# Patient Record
Sex: MALE | Race: BLACK OR AFRICAN AMERICAN | NOT HISPANIC OR LATINO
[De-identification: names, ages, dates, MRNs, and addresses within clinical notes are randomized per-mention and may not be internally consistent; named-entity substitution may affect disease eponyms.]

---

## 2017-06-06 ENCOUNTER — LABORATORY RESULT (OUTPATIENT)
Age: 47
End: 2017-06-06

## 2017-06-06 ENCOUNTER — APPOINTMENT (OUTPATIENT)
Dept: INTERNAL MEDICINE | Facility: CLINIC | Age: 47
End: 2017-06-06

## 2017-06-06 ENCOUNTER — NON-APPOINTMENT (OUTPATIENT)
Age: 47
End: 2017-06-06

## 2017-06-06 VITALS
SYSTOLIC BLOOD PRESSURE: 137 MMHG | BODY MASS INDEX: 31.37 KG/M2 | HEART RATE: 86 BPM | WEIGHT: 207 LBS | DIASTOLIC BLOOD PRESSURE: 87 MMHG | HEIGHT: 68 IN

## 2017-06-06 DIAGNOSIS — Z87.891 PERSONAL HISTORY OF NICOTINE DEPENDENCE: ICD-10-CM

## 2017-06-06 DIAGNOSIS — Z82.49 FAMILY HISTORY OF ISCHEMIC HEART DISEASE AND OTHER DISEASES OF THE CIRCULATORY SYSTEM: ICD-10-CM

## 2017-06-06 DIAGNOSIS — Z83.3 FAMILY HISTORY OF DIABETES MELLITUS: ICD-10-CM

## 2017-06-06 DIAGNOSIS — L30.9 DERMATITIS, UNSPECIFIED: ICD-10-CM

## 2017-06-06 DIAGNOSIS — N39.0 URINARY TRACT INFECTION, SITE NOT SPECIFIED: ICD-10-CM

## 2017-06-07 ENCOUNTER — MESSAGE (OUTPATIENT)
Age: 47
End: 2017-06-07

## 2017-06-07 DIAGNOSIS — E56.9 VITAMIN DEFICIENCY, UNSPECIFIED: ICD-10-CM

## 2017-06-07 LAB
25(OH)D3 SERPL-MCNC: 8.6 NG/ML
ALBUMIN SERPL ELPH-MCNC: 4.2 G/DL
ALP BLD-CCNC: 175 U/L
ALT SERPL-CCNC: 26 U/L
ANION GAP SERPL CALC-SCNC: 15 MMOL/L
AST SERPL-CCNC: 23 U/L
BASOPHILS # BLD AUTO: 0.02 K/UL
BASOPHILS NFR BLD AUTO: 0.3 %
BILIRUB SERPL-MCNC: 1.1 MG/DL
BILIRUB UR QL STRIP: NORMAL
BUN SERPL-MCNC: 20 MG/DL
CALCIUM SERPL-MCNC: 9.5 MG/DL
CHLORIDE SERPL-SCNC: 101 MMOL/L
CLARITY UR: CLEAR
CO2 SERPL-SCNC: 23 MMOL/L
COLLECTION METHOD: NORMAL
CREAT SERPL-MCNC: 1.53 MG/DL
EOSINOPHIL # BLD AUTO: 0.22 K/UL
EOSINOPHIL NFR BLD AUTO: 3.3 %
FOLATE SERPL-MCNC: 4.3 NG/ML
GLUCOSE SERPL-MCNC: 126 MG/DL
GLUCOSE UR-MCNC: NORMAL
HBA1C MFR BLD HPLC: 6.2 %
HCG UR QL: 1 EU/DL
HCT VFR BLD CALC: 43.4 %
HGB BLD-MCNC: 13.8 G/DL
HGB UR QL STRIP.AUTO: NORMAL
IMM GRANULOCYTES NFR BLD AUTO: 0.2 %
KETONES UR-MCNC: NORMAL
LEUKOCYTE ESTERASE UR QL STRIP: NORMAL
LYMPHOCYTES # BLD AUTO: 1.28 K/UL
LYMPHOCYTES NFR BLD AUTO: 19.4 %
MAN DIFF?: NORMAL
MCHC RBC-ENTMCNC: 29.2 PG
MCHC RBC-ENTMCNC: 31.8 GM/DL
MCV RBC AUTO: 91.8 FL
MONOCYTES # BLD AUTO: 0.34 K/UL
MONOCYTES NFR BLD AUTO: 5.2 %
NEUTROPHILS # BLD AUTO: 4.72 K/UL
NEUTROPHILS NFR BLD AUTO: 71.6 %
NITRITE UR QL STRIP: NORMAL
PH UR STRIP: 5.5
PLATELET # BLD AUTO: 194 K/UL
POTASSIUM SERPL-SCNC: 4.1 MMOL/L
PROT SERPL-MCNC: 8 G/DL
PROT UR STRIP-MCNC: 100
RBC # BLD: 4.73 M/UL
RBC # FLD: 14.7 %
SAVE SPECIMEN: NORMAL
SODIUM SERPL-SCNC: 139 MMOL/L
SP GR UR STRIP: 1.03
T3FREE SERPL-MCNC: 2.7 PG/ML
TSH SERPL-ACNC: 1.49 UIU/ML
VIT B12 SERPL-MCNC: 838 PG/ML
WBC # FLD AUTO: 6.59 K/UL

## 2017-06-07 RX ORDER — CHROMIUM 200 MCG
1000 TABLET ORAL DAILY
Qty: 90 | Refills: 3 | Status: ACTIVE | COMMUNITY
Start: 2017-06-07 | End: 1900-01-01

## 2017-06-08 LAB — BACTERIA UR CULT: NORMAL

## 2017-06-11 ENCOUNTER — EMERGENCY (EMERGENCY)
Facility: HOSPITAL | Age: 47
LOS: 1 days | Discharge: HOME | End: 2017-06-11
Attending: EMERGENCY MEDICINE | Admitting: EMERGENCY MEDICINE
Payer: MEDICARE

## 2017-06-11 VITALS
RESPIRATION RATE: 16 BRPM | OXYGEN SATURATION: 98 % | HEART RATE: 71 BPM | SYSTOLIC BLOOD PRESSURE: 118 MMHG | DIASTOLIC BLOOD PRESSURE: 80 MMHG | TEMPERATURE: 98 F

## 2017-06-11 VITALS
OXYGEN SATURATION: 99 % | RESPIRATION RATE: 20 BRPM | SYSTOLIC BLOOD PRESSURE: 130 MMHG | TEMPERATURE: 98 F | HEART RATE: 89 BPM | DIASTOLIC BLOOD PRESSURE: 80 MMHG

## 2017-06-11 DIAGNOSIS — N45.1 EPIDIDYMITIS: ICD-10-CM

## 2017-06-11 LAB
ALBUMIN SERPL ELPH-MCNC: 4 G/DL — SIGNIFICANT CHANGE UP (ref 3.3–5)
ALP SERPL-CCNC: 219 U/L — HIGH (ref 40–120)
ALT FLD-CCNC: 33 U/L — SIGNIFICANT CHANGE UP (ref 4–41)
APPEARANCE UR: CLEAR — SIGNIFICANT CHANGE UP
AST SERPL-CCNC: 28 U/L — SIGNIFICANT CHANGE UP (ref 4–40)
BASOPHILS # BLD AUTO: 0.01 K/UL — SIGNIFICANT CHANGE UP (ref 0–0.2)
BASOPHILS NFR BLD AUTO: 0.1 % — SIGNIFICANT CHANGE UP (ref 0–2)
BILIRUB SERPL-MCNC: 0.8 MG/DL — SIGNIFICANT CHANGE UP (ref 0.2–1.2)
BILIRUB UR-MCNC: NEGATIVE — SIGNIFICANT CHANGE UP
BLOOD UR QL VISUAL: NEGATIVE — SIGNIFICANT CHANGE UP
BUN SERPL-MCNC: 18 MG/DL — SIGNIFICANT CHANGE UP (ref 7–23)
CALCIUM SERPL-MCNC: 9.7 MG/DL — SIGNIFICANT CHANGE UP (ref 8.4–10.5)
CHLORIDE SERPL-SCNC: 103 MMOL/L — SIGNIFICANT CHANGE UP (ref 98–107)
CO2 SERPL-SCNC: 24 MMOL/L — SIGNIFICANT CHANGE UP (ref 22–31)
COLOR SPEC: YELLOW — SIGNIFICANT CHANGE UP
CREAT SERPL-MCNC: 1.54 MG/DL — HIGH (ref 0.5–1.3)
EOSINOPHIL # BLD AUTO: 0.37 K/UL — SIGNIFICANT CHANGE UP (ref 0–0.5)
EOSINOPHIL NFR BLD AUTO: 4.3 % — SIGNIFICANT CHANGE UP (ref 0–6)
GLUCOSE SERPL-MCNC: 92 MG/DL — SIGNIFICANT CHANGE UP (ref 70–99)
GLUCOSE UR-MCNC: NEGATIVE — SIGNIFICANT CHANGE UP
HCT VFR BLD CALC: 42.4 % — SIGNIFICANT CHANGE UP (ref 39–50)
HGB BLD-MCNC: 13.9 G/DL — SIGNIFICANT CHANGE UP (ref 13–17)
IMM GRANULOCYTES NFR BLD AUTO: 0.1 % — SIGNIFICANT CHANGE UP (ref 0–1.5)
KETONES UR-MCNC: NEGATIVE — SIGNIFICANT CHANGE UP
LEUKOCYTE ESTERASE UR-ACNC: HIGH
LYMPHOCYTES # BLD AUTO: 1.67 K/UL — SIGNIFICANT CHANGE UP (ref 1–3.3)
LYMPHOCYTES # BLD AUTO: 19.6 % — SIGNIFICANT CHANGE UP (ref 13–44)
MCHC RBC-ENTMCNC: 29.6 PG — SIGNIFICANT CHANGE UP (ref 27–34)
MCHC RBC-ENTMCNC: 32.8 % — SIGNIFICANT CHANGE UP (ref 32–36)
MCV RBC AUTO: 90.4 FL — SIGNIFICANT CHANGE UP (ref 80–100)
MONOCYTES # BLD AUTO: 0.76 K/UL — SIGNIFICANT CHANGE UP (ref 0–0.9)
MONOCYTES NFR BLD AUTO: 8.9 % — SIGNIFICANT CHANGE UP (ref 2–14)
MUCOUS THREADS # UR AUTO: SIGNIFICANT CHANGE UP
NEUTROPHILS # BLD AUTO: 5.7 K/UL — SIGNIFICANT CHANGE UP (ref 1.8–7.4)
NEUTROPHILS NFR BLD AUTO: 67 % — SIGNIFICANT CHANGE UP (ref 43–77)
NITRITE UR-MCNC: NEGATIVE — SIGNIFICANT CHANGE UP
PH UR: 5.5 — SIGNIFICANT CHANGE UP (ref 4.6–8)
PLATELET # BLD AUTO: 222 K/UL — SIGNIFICANT CHANGE UP (ref 150–400)
PMV BLD: 12 FL — SIGNIFICANT CHANGE UP (ref 7–13)
POTASSIUM SERPL-MCNC: 4.8 MMOL/L — SIGNIFICANT CHANGE UP (ref 3.5–5.3)
POTASSIUM SERPL-SCNC: 4.8 MMOL/L — SIGNIFICANT CHANGE UP (ref 3.5–5.3)
PROT SERPL-MCNC: 7.9 G/DL — SIGNIFICANT CHANGE UP (ref 6–8.3)
PROT UR-MCNC: 30 — SIGNIFICANT CHANGE UP
RBC # BLD: 4.69 M/UL — SIGNIFICANT CHANGE UP (ref 4.2–5.8)
RBC # FLD: 14.1 % — SIGNIFICANT CHANGE UP (ref 10.3–14.5)
RBC CASTS # UR COMP ASSIST: SIGNIFICANT CHANGE UP (ref 0–?)
SODIUM SERPL-SCNC: 139 MMOL/L — SIGNIFICANT CHANGE UP (ref 135–145)
SP GR SPEC: 1.02 — SIGNIFICANT CHANGE UP (ref 1–1.03)
SQUAMOUS # UR AUTO: SIGNIFICANT CHANGE UP
UROBILINOGEN FLD QL: 1 E.U. — SIGNIFICANT CHANGE UP (ref 0.1–0.2)
WBC # BLD: 8.52 K/UL — SIGNIFICANT CHANGE UP (ref 3.8–10.5)
WBC # FLD AUTO: 8.52 K/UL — SIGNIFICANT CHANGE UP (ref 3.8–10.5)
WBC UR QL: SIGNIFICANT CHANGE UP (ref 0–?)

## 2017-06-11 PROCEDURE — 99285 EMERGENCY DEPT VISIT HI MDM: CPT

## 2017-06-11 PROCEDURE — 76870 US EXAM SCROTUM: CPT | Mod: 26

## 2017-06-11 RX ORDER — CIPROFLOXACIN LACTATE 400MG/40ML
1 VIAL (ML) INTRAVENOUS
Qty: 14 | Refills: 0 | OUTPATIENT
Start: 2017-06-11 | End: 2017-06-25

## 2017-06-11 NOTE — ED PROVIDER NOTE - MEDICAL DECISION MAKING DETAILS
left testicular pain, swelling. concern for torsion, us testicle ordered placed. pt was taken prior to my being able to do a bedside us.

## 2017-06-11 NOTE — CONSULT NOTE ADULT - SUBJECTIVE AND OBJECTIVE BOX
HPI  This is a 45 y/o M with a hx of mutiple back surgeries and a coccidiomycosis infection presenting with 1 week of L testicular pain and swelling.  Now with continued swelling and pain after starting doxycyline as per his PMD 3 days ago.  No nausea/vomiting, fevers/chills, dysuria/hematuria/pyuria, incomplete emptying.    PAST MEDICAL & SURGICAL HISTORY:  Coccidioidomycosis  Multiple laminectomies/revisions of his lumbosacral spine    MEDS: none    FAMILY HISTORY:  Brother - DM, ESRD      Allergies: No Known Allergies    SOCIAL HISTORY: 2 new recent sexual partners, unaware if they have a hx of STIs.    REVIEW OF SYSTEMS: Otherwise negative as stated in HPI    Physical Exam  Vital signs  T(C): 36.9, Max: 36.9 (06-11 @ 14:01)  HR: 71  BP: 118/80  SpO2: 98%  Wt(kg): --    UOP: Not recorded -1 missed void in ER    Gen:  [x] NAD [] toxic    Pulm:  [x] no resp distress [] no substernal retractions  	  CV:  [] no JVD  [x] RRR    GI:  [x] Soft [x] ND [x] NT    :  Glans Circumcised [x]Y  []N, []lesions:  Meatus Discharge []Y  [x] N,  Blood []Y [x] N  Testes  Descended [x]Y  []N,    Tender [x]Y  []N,   Epididymis Tender  [x]Y []N,    Cremasteric []Y  [x]N                        	  MSK:  Edema []Y [x]N    LABS:      06-11 @ 16:26    WBC 8.52  / Hct 42.4  / SCr --       Urine Cx: P    RADIOLOGY:    EXAM:  US SCROTUM AND CONTENTS        PROCEDURE DATE:  Jun 11 2017         INTERPRETATION:  CLINICAL INFORMATION: Left testicular pain and swelling.    COMPARISON: None available.    TECHNIQUE: Testicular ultrasound utilizing color and spectral Doppler.    FINDINGS:    Right testis: 3.6 x 2.2 x 1.3 cm. Normal echogenicity and echotexture   with no masses or areas of architectural distortion. Normal blood flow   pattern.  Right epididymis: 0.8 x 0.5 x 1.0 cm. Within normal limits.    Left testis: 3.7 x 2.0 x 1.6 cm cm. Normal echogenicity and echotexture   with no masses or areas of architectural distortion. Normal blood flow   pattern.  Left epididymis: 1.9 x 1.1 x 1.5 cm. There is hyperemia and enlargement   of the left epididymis. There is overlying scrotal thickening.    Hydrocele: Large complex left hydrocele with septations.  Varicocele: None.    IMPRESSION:     Severe left epididymitis with a large complex left hydrocele.              SOURAV GARCIA M.D., RADIOLOGY RESIDENT  This document has been electronically signed.  KATIANA TAMAYO M.D., ATTENDING RADIOLOGIST  This document has been electronically signed. Jun 11 2017  4:00PM

## 2017-06-11 NOTE — ED PROVIDER NOTE - OBJECTIVE STATEMENT
47m, pmhx remote coccidiomycosis was dx with UTI 3 weeks ago, with left testicular pain. has been progressive, worsening over the last week with increasing swelling. no f/c, no urinary discharge or lesions. in the last day, pain has progressed to left groin. no n/v, no change in stools, no dysuria or hematuria.

## 2017-06-11 NOTE — ED ADULT TRIAGE NOTE - CHIEF COMPLAINT QUOTE
pt was Dx. with UTI taking Antibx started on Wed. now coming with left testicle pain rad. to Left lower ABD pain pt stated pain started since last wk Sunday.  denies dysuria/hematuria.   pt was seen by DX sent for MRI R/O Left Epididymitis pt did not made an appt. due to pain.

## 2017-06-11 NOTE — CONSULT NOTE ADULT - PROBLEM SELECTOR RECOMMENDATION 9
- Levaquin x 14 days  - continue doxycycline for coverage of gonococcus  - send GC/Chlamydia PCR  - urine culture, urinalysis  - follow up with Dr. Barrow 1-2 weeks.

## 2017-06-11 NOTE — ED PROVIDER NOTE - PROGRESS NOTE DETAILS
US reveals left severe epididymitis with complex hydrocele. will get basic bloods. uro consult. pt updated. Prakash Travis: Pt signed out to me by Dr. Gil. Plan to follow up urology consult. Urology seen pt at bedside. Recommends to send off urine culture, GC culture. Pt stable for discharge and to continue taking doxycycline, plus give rx for levaquin. Pt to follow up with Dr. Barrow in 2 weeks.

## 2017-06-12 LAB
C TRACH RRNA SPEC QL NAA+PROBE: SIGNIFICANT CHANGE UP
N GONORRHOEA RRNA SPEC QL NAA+PROBE: SIGNIFICANT CHANGE UP
SPECIMEN SOURCE: SIGNIFICANT CHANGE UP

## 2017-06-19 ENCOUNTER — APPOINTMENT (OUTPATIENT)
Dept: DERMATOLOGY | Facility: CLINIC | Age: 47
End: 2017-06-19

## 2017-06-19 VITALS — SYSTOLIC BLOOD PRESSURE: 118 MMHG | DIASTOLIC BLOOD PRESSURE: 76 MMHG

## 2017-06-19 DIAGNOSIS — B36.0 PITYRIASIS VERSICOLOR: ICD-10-CM

## 2017-06-19 DIAGNOSIS — L21.9 SEBORRHEIC DERMATITIS, UNSPECIFIED: ICD-10-CM

## 2017-06-21 ENCOUNTER — MESSAGE (OUTPATIENT)
Age: 47
End: 2017-06-21

## 2017-06-27 ENCOUNTER — APPOINTMENT (OUTPATIENT)
Dept: UROLOGY | Facility: CLINIC | Age: 47
End: 2017-06-27

## 2017-06-27 ENCOUNTER — OUTPATIENT (OUTPATIENT)
Dept: OUTPATIENT SERVICES | Facility: HOSPITAL | Age: 47
LOS: 1 days | Discharge: HOME | End: 2017-06-27

## 2017-06-28 ENCOUNTER — APPOINTMENT (OUTPATIENT)
Dept: UROLOGY | Facility: CLINIC | Age: 47
End: 2017-06-28

## 2017-06-29 ENCOUNTER — APPOINTMENT (OUTPATIENT)
Dept: RADIATION ONCOLOGY | Facility: CLINIC | Age: 47
End: 2017-06-29
Payer: MEDICARE

## 2017-06-29 VITALS
HEART RATE: 80 BPM | SYSTOLIC BLOOD PRESSURE: 124 MMHG | BODY MASS INDEX: 30.71 KG/M2 | DIASTOLIC BLOOD PRESSURE: 80 MMHG | WEIGHT: 202 LBS

## 2017-06-29 DIAGNOSIS — Z86.19 PERSONAL HISTORY OF OTHER INFECTIOUS AND PARASITIC DISEASES: ICD-10-CM

## 2017-06-29 PROCEDURE — 99204 OFFICE O/P NEW MOD 45 MIN: CPT | Mod: GC

## 2017-06-30 ENCOUNTER — APPOINTMENT (OUTPATIENT)
Dept: PLASTIC SURGERY | Facility: CLINIC | Age: 47
End: 2017-06-30

## 2017-06-30 VITALS
WEIGHT: 205 LBS | SYSTOLIC BLOOD PRESSURE: 133 MMHG | TEMPERATURE: 98.9 F | HEART RATE: 73 BPM | HEIGHT: 70 IN | DIASTOLIC BLOOD PRESSURE: 85 MMHG | BODY MASS INDEX: 29.35 KG/M2

## 2017-08-01 ENCOUNTER — APPOINTMENT (OUTPATIENT)
Dept: DERMATOLOGY | Facility: CLINIC | Age: 47
End: 2017-08-01

## 2017-08-09 ENCOUNTER — OUTPATIENT (OUTPATIENT)
Dept: OUTPATIENT SERVICES | Facility: HOSPITAL | Age: 47
LOS: 1 days | End: 2017-08-09
Payer: COMMERCIAL

## 2017-08-09 VITALS
OXYGEN SATURATION: 100 % | SYSTOLIC BLOOD PRESSURE: 122 MMHG | HEIGHT: 70 IN | HEART RATE: 66 BPM | DIASTOLIC BLOOD PRESSURE: 80 MMHG | TEMPERATURE: 98 F | RESPIRATION RATE: 16 BRPM | WEIGHT: 205.03 LBS

## 2017-08-09 DIAGNOSIS — Z01.818 ENCOUNTER FOR OTHER PREPROCEDURAL EXAMINATION: ICD-10-CM

## 2017-08-09 DIAGNOSIS — Z98.890 OTHER SPECIFIED POSTPROCEDURAL STATES: Chronic | ICD-10-CM

## 2017-08-09 DIAGNOSIS — L91.0 HYPERTROPHIC SCAR: ICD-10-CM

## 2017-08-09 PROCEDURE — G0463: CPT

## 2017-08-09 NOTE — H&P PST ADULT - PMH
Coccidioidomycosis  spine  Hypertrophic scar of skin    Spinal stenosis of thoracolumbar region Coccidioidomycosis  spine  Epididymitis    Hypertrophic scar of skin    Spinal stenosis of thoracolumbar region

## 2017-08-09 NOTE — H&P PST ADULT - HISTORY OF PRESENT ILLNESS
48 Y/O Male H/O Hypertrophic scar. Seen by MD. Presents Excision of left ear keloid, right posterior scalp possible skin graft, possible local flap.

## 2017-08-09 NOTE — H&P PST ADULT - NSANTHOSAYNRD_GEN_A_CORE
No. MYNOR screening performed.  STOP BANG Legend: 0-2 = LOW Risk; 3-4 = INTERMEDIATE Risk; 5-8 = HIGH Risk

## 2017-08-14 ENCOUNTER — RESULT REVIEW (OUTPATIENT)
Age: 47
End: 2017-08-14

## 2017-08-14 ENCOUNTER — OUTPATIENT (OUTPATIENT)
Dept: OUTPATIENT SERVICES | Facility: HOSPITAL | Age: 47
LOS: 1 days | End: 2017-08-14
Payer: COMMERCIAL

## 2017-08-14 VITALS
DIASTOLIC BLOOD PRESSURE: 72 MMHG | TEMPERATURE: 98 F | RESPIRATION RATE: 16 BRPM | OXYGEN SATURATION: 100 % | SYSTOLIC BLOOD PRESSURE: 122 MMHG | HEART RATE: 80 BPM

## 2017-08-14 VITALS
HEIGHT: 70 IN | DIASTOLIC BLOOD PRESSURE: 76 MMHG | TEMPERATURE: 98 F | WEIGHT: 205.03 LBS | SYSTOLIC BLOOD PRESSURE: 123 MMHG | OXYGEN SATURATION: 100 % | RESPIRATION RATE: 16 BRPM | HEART RATE: 73 BPM

## 2017-08-14 DIAGNOSIS — L91.0 HYPERTROPHIC SCAR: ICD-10-CM

## 2017-08-14 DIAGNOSIS — Z98.890 OTHER SPECIFIED POSTPROCEDURAL STATES: Chronic | ICD-10-CM

## 2017-08-14 PROCEDURE — 14301 TIS TRNFR ANY 30.1-60 SQ CM: CPT

## 2017-08-14 PROCEDURE — 14061 TIS TRNFR E/N/E/L10.1-30SQCM: CPT | Mod: 59

## 2017-08-14 PROCEDURE — 88305 TISSUE EXAM BY PATHOLOGIST: CPT | Mod: 26

## 2017-08-14 PROCEDURE — 15004 WOUND PREP F/N/HF/G: CPT

## 2017-08-14 PROCEDURE — 88305 TISSUE EXAM BY PATHOLOGIST: CPT

## 2017-08-14 PROCEDURE — 14061 TIS TRNFR E/N/E/L10.1-30SQCM: CPT | Mod: XS

## 2017-08-14 RX ORDER — ONDANSETRON 8 MG/1
4 TABLET, FILM COATED ORAL ONCE
Qty: 0 | Refills: 0 | Status: DISCONTINUED | OUTPATIENT
Start: 2017-08-14 | End: 2017-08-29

## 2017-08-14 RX ORDER — FAMOTIDINE 10 MG/ML
1 INJECTION INTRAVENOUS
Qty: 0 | Refills: 0 | COMMUNITY

## 2017-08-14 RX ORDER — ACETAMINOPHEN 500 MG
1000 TABLET ORAL ONCE
Qty: 0 | Refills: 0 | Status: DISCONTINUED | OUTPATIENT
Start: 2017-08-14 | End: 2017-08-29

## 2017-08-14 RX ORDER — OXYCODONE HYDROCHLORIDE 5 MG/1
10 TABLET ORAL ONCE
Qty: 0 | Refills: 0 | Status: DISCONTINUED | OUTPATIENT
Start: 2017-08-14 | End: 2017-08-14

## 2017-08-14 NOTE — ASU DISCHARGE PLAN (ADULT/PEDIATRIC). - MEDICATION SUMMARY - MEDICATIONS TO TAKE
I will START or STAY ON the medications listed below when I get home from the hospital:    Percocet 5/325 325 mg-5 mg oral tablet  -- 1-2 tab(s) by mouth every 4-6 hours, As Needed -for severe pain MDD:8  -- Caution federal law prohibits the transfer of this drug to any person other  than the person for whom it was prescribed.  May cause drowsiness.  Alcohol may intensify this effect.  Use care when operating dangerous machinery.  This prescription cannot be refilled.  This product contains acetaminophen.  Do not use  with any other product containing acetaminophen to prevent possible liver damage.  Using more of this medication than prescribed may cause serious breathing problems.    -- Indication: For Pain

## 2017-08-14 NOTE — PRE-ANESTHESIA EVALUATION ADULT - NSANTHPMHFT_GEN_ALL_CORE
lumbar fusion l4-5 x 2.  anterior approach complicated by vascular laceration requiring 17 hr procedure.  denies cardiopulm dis  no gerd  no anesthetic complications

## 2017-08-14 NOTE — ASU DISCHARGE PLAN (ADULT/PEDIATRIC). - MEDICATION SUMMARY - MEDICATIONS TO STOP TAKING
I will STOP taking the medications listed below when I get home from the hospital:    famotidine 20 mg oral tablet  -- 1 tab(s) by mouth 2 doses preop Hs and Am of SX

## 2017-08-14 NOTE — ASU DISCHARGE PLAN (ADULT/PEDIATRIC). - ITEMS TO FOLLOWUP WITH YOUR PHYSICIAN'S
Please follow up with Dr. Buenrostro Friday 8/18. You may call [phone] to schedule an appointment.

## 2017-08-14 NOTE — BRIEF OPERATIVE NOTE - PROCEDURE
Excision of keloid of head  08/14/2017  right retroauricular region  Active  BSCHULTZ1  Excision of keloid of left ear  08/14/2017    Active  [id number]

## 2017-08-14 NOTE — ASU PATIENT PROFILE, ADULT - PMH
Coccidioidomycosis  spine  Epididymitis    Hypertrophic scar of skin    Spinal stenosis of thoracolumbar region

## 2017-08-14 NOTE — ASU DISCHARGE PLAN (ADULT/PEDIATRIC). - SPECIAL INSTRUCTIONS
Resume normal diet. Avoid straining, exercise, or heavy lifting.    Take medications as instructed by prescriptions.    Apply bacitracin to your incisions twice per day.     In 48 hours, ok to shower/rinse with warm/soapy water, pat dry (NO scrubbing or rubbing).    Follow-up with primary care doctor as well.     Call 911 and return to the ED for chest pain, shortness of breath, significant increase in pain, or significant change in color of surgical sites.

## 2017-08-14 NOTE — ASU DISCHARGE PLAN (ADULT/PEDIATRIC). - NOTIFY
Numbness, color, or temperature change to extremity/Fever greater than 101/Persistent Nausea and Vomiting/Increased Irritability or Sluggishness/Bleeding that does not stop/Numbness, tingling/Inability to Tolerate Liquids or Foods/Pain not relieved by Medications/Swelling that continues/Excessive Diarrhea

## 2017-08-15 PROCEDURE — 77261 THER RADIOLOGY TX PLNG SMPL: CPT

## 2017-08-15 PROCEDURE — 77334 RADIATION TREATMENT AID(S): CPT | Mod: 26

## 2017-08-15 PROCEDURE — 77300 RADIATION THERAPY DOSE PLAN: CPT | Mod: 26

## 2017-08-17 PROCEDURE — 77427 RADIATION TX MANAGEMENT X5: CPT

## 2017-08-18 ENCOUNTER — APPOINTMENT (OUTPATIENT)
Dept: PLASTIC SURGERY | Facility: CLINIC | Age: 47
End: 2017-08-18
Payer: MEDICARE

## 2017-08-18 PROCEDURE — 99024 POSTOP FOLLOW-UP VISIT: CPT

## 2017-08-23 ENCOUNTER — TRANSCRIPTION ENCOUNTER (OUTPATIENT)
Age: 47
End: 2017-08-23

## 2017-08-25 ENCOUNTER — APPOINTMENT (OUTPATIENT)
Dept: PLASTIC SURGERY | Facility: CLINIC | Age: 47
End: 2017-08-25
Payer: MEDICARE

## 2017-08-25 DIAGNOSIS — L91.0 HYPERTROPHIC SCAR: ICD-10-CM

## 2017-08-25 PROCEDURE — 99024 POSTOP FOLLOW-UP VISIT: CPT

## 2017-09-08 ENCOUNTER — APPOINTMENT (OUTPATIENT)
Dept: PLASTIC SURGERY | Facility: CLINIC | Age: 47
End: 2017-09-08
Payer: MEDICARE

## 2017-09-08 PROCEDURE — 99024 POSTOP FOLLOW-UP VISIT: CPT

## 2017-09-22 ENCOUNTER — APPOINTMENT (OUTPATIENT)
Dept: ULTRASOUND IMAGING | Facility: IMAGING CENTER | Age: 47
End: 2017-09-22

## 2017-09-22 ENCOUNTER — APPOINTMENT (OUTPATIENT)
Dept: UROLOGY | Facility: CLINIC | Age: 47
End: 2017-09-22

## 2017-10-19 PROBLEM — L91.0 KELOID: Status: ACTIVE | Noted: 2017-06-06

## 2018-07-20 ENCOUNTER — APPOINTMENT (OUTPATIENT)
Dept: INTERNAL MEDICINE | Facility: CLINIC | Age: 48
End: 2018-07-20
Payer: MEDICARE

## 2018-07-20 VITALS
HEIGHT: 68.5 IN | WEIGHT: 200 LBS | BODY MASS INDEX: 29.96 KG/M2 | DIASTOLIC BLOOD PRESSURE: 81 MMHG | SYSTOLIC BLOOD PRESSURE: 130 MMHG | HEART RATE: 67 BPM

## 2018-07-20 DIAGNOSIS — N45.1 EPIDIDYMITIS: ICD-10-CM

## 2018-07-20 DIAGNOSIS — N43.3 HYDROCELE, UNSPECIFIED: ICD-10-CM

## 2018-07-20 DIAGNOSIS — H53.8 OTHER VISUAL DISTURBANCES: ICD-10-CM

## 2018-07-20 LAB
BILIRUB UR QL STRIP: NORMAL
CLARITY UR: CLEAR
COLLECTION METHOD: NORMAL
GLUCOSE UR-MCNC: NORMAL
HCG UR QL: 4 EU/DL
HGB UR QL STRIP.AUTO: NORMAL
KETONES UR-MCNC: NORMAL
LEUKOCYTE ESTERASE UR QL STRIP: NORMAL
NITRITE UR QL STRIP: NORMAL
PH UR STRIP: 7
PROT UR STRIP-MCNC: NORMAL
SAVE SPECIMEN: NORMAL
SP GR UR STRIP: 1.02

## 2018-07-20 PROCEDURE — 36415 COLL VENOUS BLD VENIPUNCTURE: CPT

## 2018-07-20 PROCEDURE — 99396 PREV VISIT EST AGE 40-64: CPT | Mod: 25

## 2018-07-20 PROCEDURE — 81003 URINALYSIS AUTO W/O SCOPE: CPT | Mod: QW

## 2018-07-20 RX ORDER — DOXYCYCLINE HYCLATE 100 MG/1
100 CAPSULE ORAL
Qty: 20 | Refills: 0 | Status: DISCONTINUED | COMMUNITY
Start: 2017-06-06 | End: 2018-07-20

## 2018-07-20 RX ORDER — OXYCODONE AND ACETAMINOPHEN 5; 325 MG/1; MG/1
5-325 TABLET ORAL
Qty: 30 | Refills: 0 | Status: DISCONTINUED | COMMUNITY
Start: 2017-08-15 | End: 2018-07-20

## 2018-07-20 RX ORDER — LEVOFLOXACIN 500 MG/1
500 TABLET, FILM COATED ORAL
Qty: 14 | Refills: 0 | Status: DISCONTINUED | COMMUNITY
Start: 2017-06-11 | End: 2018-07-20

## 2018-07-23 ENCOUNTER — RX RENEWAL (OUTPATIENT)
Age: 48
End: 2018-07-23

## 2018-07-23 ENCOUNTER — TRANSCRIPTION ENCOUNTER (OUTPATIENT)
Age: 48
End: 2018-07-23

## 2018-07-24 ENCOUNTER — MESSAGE (OUTPATIENT)
Age: 48
End: 2018-07-24

## 2018-07-24 PROBLEM — N43.3 LEFT HYDROCELE: Status: ACTIVE | Noted: 2017-06-21

## 2018-07-24 PROBLEM — N45.1 EPIDIDYMITIS, LEFT: Status: RESOLVED | Noted: 2017-06-06 | Resolved: 2018-07-24

## 2018-07-24 NOTE — ASSESSMENT
[FreeTextEntry1] : Hx of left  testicular epididymitis, this has resolved with antibiotic. Did see a urologist who also found a small hydrocele on that side. Will monitor probably does not require management. \par THe abdominal distention probably from gas pain. reccomended simethicone available OTC,a nd following a FODMAPS diet to reduce bowel gas.  \par Patient  feeling well otherwise. Check CBC, CMP, HgA1c, Lipid profile, TSH, Vitamin D, UA

## 2018-07-24 NOTE — HISTORY OF PRESENT ILLNESS
[de-identified] : Patient returns for CPE.  Lost 5 lbs since last year.  Has been trying to drink a lot of water given modestly high creatinine from last year.  \par \par Patient occasionally breathing heavy during exercise but can climb stairs.  \par \par Left lower quadrant, has experienced  pain severe gas from time to time.\par \par Otherwise well.

## 2018-07-24 NOTE — PHYSICAL EXAM

## 2018-07-25 ENCOUNTER — MEDICATION RENEWAL (OUTPATIENT)
Age: 48
End: 2018-07-25

## 2018-07-25 LAB
25(OH)D3 SERPL-MCNC: 17.8 NG/ML
ALBUMIN SERPL ELPH-MCNC: 4.4 G/DL
ALP BLD-CCNC: 162 U/L
ALT SERPL-CCNC: 26 U/L
ANION GAP SERPL CALC-SCNC: 12 MMOL/L
APPEARANCE: CLEAR
AST SERPL-CCNC: 27 U/L
BACTERIA UR CULT: NORMAL
BACTERIA: NEGATIVE
BASOPHILS # BLD AUTO: 0.02 K/UL
BASOPHILS NFR BLD AUTO: 0.3 %
BILIRUB SERPL-MCNC: 1 MG/DL
BILIRUBIN URINE: NEGATIVE
BLOOD URINE: ABNORMAL
BUN SERPL-MCNC: 21 MG/DL
CALCIUM SERPL-MCNC: 10 MG/DL
CHLORIDE SERPL-SCNC: 105 MMOL/L
CHOLEST SERPL-MCNC: 254 MG/DL
CHOLEST/HDLC SERPL: 5.8 RATIO
CO2 SERPL-SCNC: 26 MMOL/L
COLOR: YELLOW
CREAT SERPL-MCNC: 1.64 MG/DL
EOSINOPHIL # BLD AUTO: 0.25 K/UL
EOSINOPHIL NFR BLD AUTO: 4.3 %
GLUCOSE QUALITATIVE U: NEGATIVE MG/DL
GLUCOSE SERPL-MCNC: 98 MG/DL
HBA1C MFR BLD HPLC: 6.2 %
HCT VFR BLD CALC: 44.9 %
HDLC SERPL-MCNC: 44 MG/DL
HGB BLD-MCNC: 14.7 G/DL
HYALINE CASTS: 8 /LPF
IMM GRANULOCYTES NFR BLD AUTO: 0.2 %
KETONES URINE: NEGATIVE
LDLC SERPL CALC-MCNC: 194 MG/DL
LEUKOCYTE ESTERASE URINE: ABNORMAL
LYMPHOCYTES # BLD AUTO: 1.61 K/UL
LYMPHOCYTES NFR BLD AUTO: 27.8 %
MAN DIFF?: NORMAL
MCHC RBC-ENTMCNC: 30.2 PG
MCHC RBC-ENTMCNC: 32.7 GM/DL
MCV RBC AUTO: 92.2 FL
MICROSCOPIC-UA: NORMAL
MONOCYTES # BLD AUTO: 0.46 K/UL
MONOCYTES NFR BLD AUTO: 7.9 %
NEUTROPHILS # BLD AUTO: 3.44 K/UL
NEUTROPHILS NFR BLD AUTO: 59.5 %
NITRITE URINE: NEGATIVE
PH URINE: 6.5
PLATELET # BLD AUTO: 204 K/UL
POTASSIUM SERPL-SCNC: 5 MMOL/L
PROT SERPL-MCNC: 8.1 G/DL
PROTEIN URINE: 30 MG/DL
RBC # BLD: 4.87 M/UL
RBC # FLD: 14.3 %
RED BLOOD CELLS URINE: 5 /HPF
SODIUM SERPL-SCNC: 143 MMOL/L
SPECIFIC GRAVITY URINE: 1.02
SQUAMOUS EPITHELIAL CELLS: 2 /HPF
T4 SERPL-MCNC: 7.5 UG/DL
TRIGL SERPL-MCNC: 82 MG/DL
TSH SERPL-ACNC: 1.21 UIU/ML
UROBILINOGEN URINE: 2 MG/DL
WBC # FLD AUTO: 5.79 K/UL
WHITE BLOOD CELLS URINE: 50 /HPF

## 2018-08-30 ENCOUNTER — TRANSCRIPTION ENCOUNTER (OUTPATIENT)
Age: 48
End: 2018-08-30

## 2018-08-30 ENCOUNTER — RX RENEWAL (OUTPATIENT)
Age: 48
End: 2018-08-30

## 2018-08-31 ENCOUNTER — TRANSCRIPTION ENCOUNTER (OUTPATIENT)
Age: 48
End: 2018-08-31

## 2018-08-31 ENCOUNTER — RX RENEWAL (OUTPATIENT)
Age: 48
End: 2018-08-31

## 2018-10-15 ENCOUNTER — MEDICATION RENEWAL (OUTPATIENT)
Age: 48
End: 2018-10-15

## 2018-10-15 RX ORDER — TRIAMCINOLONE ACETONIDE 1 MG/G
0.1 CREAM TOPICAL TWICE DAILY
Qty: 80 | Refills: 1 | Status: ACTIVE | COMMUNITY
Start: 2017-06-06 | End: 1900-01-01

## 2018-11-06 ENCOUNTER — MESSAGE (OUTPATIENT)
Age: 48
End: 2018-11-06

## 2018-12-26 ENCOUNTER — NON-APPOINTMENT (OUTPATIENT)
Age: 48
End: 2018-12-26

## 2018-12-26 ENCOUNTER — APPOINTMENT (OUTPATIENT)
Dept: INTERNAL MEDICINE | Facility: CLINIC | Age: 48
End: 2018-12-26
Payer: MEDICARE

## 2018-12-26 VITALS
SYSTOLIC BLOOD PRESSURE: 144 MMHG | WEIGHT: 206 LBS | HEART RATE: 85 BPM | DIASTOLIC BLOOD PRESSURE: 81 MMHG | HEIGHT: 69 IN | BODY MASS INDEX: 30.51 KG/M2

## 2018-12-26 PROBLEM — M48.05 SPINAL STENOSIS, THORACOLUMBAR REGION: Chronic | Status: ACTIVE | Noted: 2017-08-09

## 2018-12-26 PROBLEM — N45.1 EPIDIDYMITIS: Chronic | Status: ACTIVE | Noted: 2017-08-09

## 2018-12-26 PROBLEM — L91.0 HYPERTROPHIC SCAR: Chronic | Status: ACTIVE | Noted: 2017-08-09

## 2018-12-26 PROCEDURE — 36415 COLL VENOUS BLD VENIPUNCTURE: CPT

## 2018-12-26 PROCEDURE — 99214 OFFICE O/P EST MOD 30 MIN: CPT | Mod: 25

## 2018-12-26 PROCEDURE — 93000 ELECTROCARDIOGRAM COMPLETE: CPT

## 2018-12-27 NOTE — ASSESSMENT
[FreeTextEntry1] : Non exertional chest discomfort, as well as dsypnea on exertion on long distances.EKG WNL, BLood pressure fair.   Recommended patient see a cardiologist for more in depth evaluation.  \par \par Patients again stopped taking the pravastatin  20mg. I explained to him that hes never really followed up on time and has never taken the stating in the time leading up to the visits he does make.  Recommended patient take htep Avastatin 40mg on a daily klaudia ,and return for follow up in 1 month so we can see if the  even responds to the pravastatin.

## 2018-12-27 NOTE — PHYSICAL EXAM
[No Acute Distress] : no acute distress [Well Nourished] : well nourished [Well Developed] : well developed [Well-Appearing] : well-appearing [Normal Voice/Communication] : normal voice/communication [Normal Sclera/Conjunctiva] : normal sclera/conjunctiva [PERRL] : pupils equal round and reactive to light [EOMI] : extraocular movements intact [Normal Outer Ear/Nose] : the outer ears and nose were normal in appearance [Normal Oropharynx] : the oropharynx was normal [No JVD] : no jugular venous distention [Supple] : supple [No Lymphadenopathy] : no lymphadenopathy [Thyroid Normal, No Nodules] : the thyroid was normal and there were no nodules present [No Respiratory Distress] : no respiratory distress  [Clear to Auscultation] : lungs were clear to auscultation bilaterally [No Accessory Muscle Use] : no accessory muscle use [Normal Rate] : normal rate  [Regular Rhythm] : with a regular rhythm [Normal S1, S2] : normal S1 and S2 [No Murmur] : no murmur heard [No Carotid Bruits] : no carotid bruits [No Abdominal Bruit] : a ~M bruit was not heard ~T in the abdomen [No Varicosities] : no varicosities [Pedal Pulses Present] : the pedal pulses are present [No Edema] : there was no peripheral edema [No Extremity Clubbing/Cyanosis] : no extremity clubbing/cyanosis [No Palpable Aorta] : no palpable aorta [Normal Supraclavicular Nodes] : no supraclavicular lymphadenopathy [Normal Posterior Cervical Nodes] : no posterior cervical lymphadenopathy [Normal Anterior Cervical Nodes] : no anterior cervical lymphadenopathy [No CVA Tenderness] : no CVA  tenderness [No Spinal Tenderness] : no spinal tenderness [No Joint Swelling] : no joint swelling [Grossly Normal Strength/Tone] : grossly normal strength/tone [No Rash] : no rash [Normal Gait] : normal gait [Coordination Grossly Intact] : coordination grossly intact [No Focal Deficits] : no focal deficits [Deep Tendon Reflexes (DTR)] : deep tendon reflexes were 2+ and symmetric [Speech Grossly Normal] : speech grossly normal [Normal Affect] : the affect was normal [Normal Insight/Judgement] : insight and judgment were intact

## 2018-12-27 NOTE — HISTORY OF PRESENT ILLNESS
[FreeTextEntry8] : Patient presents for urgent appointments for chest pain.  Felt it yesterday.  Does not radiate to arms.  This occurred at rest.  Patient does notice some dyspnea on exertion without the chest pain when [place] the mall.  Feels winded, recovers after sitting down for a minute.  \par Patient on pravastatin, stopped using it a month ago.

## 2018-12-31 LAB
25(OH)D3 SERPL-MCNC: 8.3 NG/ML
ALBUMIN SERPL ELPH-MCNC: 4.3 G/DL
ALP BLD-CCNC: 135 U/L
ALT SERPL-CCNC: 35 U/L
ANION GAP SERPL CALC-SCNC: 13 MMOL/L
AST SERPL-CCNC: 29 U/L
BASOPHILS # BLD AUTO: 0.02 K/UL
BASOPHILS NFR BLD AUTO: 0.4 %
BILIRUB SERPL-MCNC: 0.4 MG/DL
BUN SERPL-MCNC: 20 MG/DL
CALCIUM SERPL-MCNC: 10.3 MG/DL
CHLORIDE SERPL-SCNC: 107 MMOL/L
CHOLEST SERPL-MCNC: 267 MG/DL
CHOLEST/HDLC SERPL: 6.1 RATIO
CO2 SERPL-SCNC: 23 MMOL/L
CREAT SERPL-MCNC: 1.59 MG/DL
EOSINOPHIL # BLD AUTO: 0.17 K/UL
EOSINOPHIL NFR BLD AUTO: 3.3 %
GLUCOSE SERPL-MCNC: 99 MG/DL
HBA1C MFR BLD HPLC: 6 %
HCT VFR BLD CALC: 43.4 %
HDLC SERPL-MCNC: 44 MG/DL
HGB BLD-MCNC: 13.9 G/DL
IMM GRANULOCYTES NFR BLD AUTO: 0.2 %
LDLC SERPL CALC-MCNC: 180 MG/DL
LYMPHOCYTES # BLD AUTO: 1.63 K/UL
LYMPHOCYTES NFR BLD AUTO: 32 %
MAN DIFF?: NORMAL
MCHC RBC-ENTMCNC: 29 PG
MCHC RBC-ENTMCNC: 32 GM/DL
MCV RBC AUTO: 90.6 FL
MONOCYTES # BLD AUTO: 0.36 K/UL
MONOCYTES NFR BLD AUTO: 7.1 %
NEUTROPHILS # BLD AUTO: 2.9 K/UL
NEUTROPHILS NFR BLD AUTO: 57 %
PLATELET # BLD AUTO: 200 K/UL
POTASSIUM SERPL-SCNC: 4.4 MMOL/L
PROT SERPL-MCNC: 7.6 G/DL
RBC # BLD: 4.79 M/UL
RBC # FLD: 14.7 %
SAVE SPECIMEN: NORMAL
SODIUM SERPL-SCNC: 143 MMOL/L
T3FREE SERPL-MCNC: 2.69 PG/ML
T4 SERPL-MCNC: 5.8 UG/DL
TRIGL SERPL-MCNC: 215 MG/DL
TSH SERPL-ACNC: 1.09 UIU/ML
WBC # FLD AUTO: 5.09 K/UL

## 2019-01-28 ENCOUNTER — MESSAGE (OUTPATIENT)
Age: 49
End: 2019-01-28

## 2019-01-28 ENCOUNTER — LABORATORY RESULT (OUTPATIENT)
Age: 49
End: 2019-01-28

## 2019-01-28 ENCOUNTER — RESULT CHARGE (OUTPATIENT)
Age: 49
End: 2019-01-28

## 2019-01-28 ENCOUNTER — APPOINTMENT (OUTPATIENT)
Dept: INTERNAL MEDICINE | Facility: CLINIC | Age: 49
End: 2019-01-28
Payer: MEDICARE

## 2019-01-28 VITALS
SYSTOLIC BLOOD PRESSURE: 125 MMHG | HEIGHT: 69 IN | WEIGHT: 206 LBS | DIASTOLIC BLOOD PRESSURE: 79 MMHG | BODY MASS INDEX: 30.51 KG/M2 | HEART RATE: 73 BPM

## 2019-01-28 DIAGNOSIS — Z00.00 ENCOUNTER FOR GENERAL ADULT MEDICAL EXAMINATION W/OUT ABNORMAL FINDINGS: ICD-10-CM

## 2019-01-28 PROCEDURE — 99214 OFFICE O/P EST MOD 30 MIN: CPT

## 2019-01-28 NOTE — HISTORY OF PRESENT ILLNESS
[de-identified] : Has not been working out, but climbs stairs without dyspnea or chest pain. \par Sees cariology tomorrow.  \par Patient taking the pravastatin 20mg daily.

## 2019-01-28 NOTE — PHYSICAL EXAM
[No Acute Distress] : no acute distress [Well Nourished] : well nourished [Well Developed] : well developed [Well-Appearing] : well-appearing [Normal Voice/Communication] : normal voice/communication [Normal Sclera/Conjunctiva] : normal sclera/conjunctiva [PERRL] : pupils equal round and reactive to light [EOMI] : extraocular movements intact [Normal Outer Ear/Nose] : the outer ears and nose were normal in appearance [Normal Oropharynx] : the oropharynx was normal [No JVD] : no jugular venous distention [Supple] : supple [No Lymphadenopathy] : no lymphadenopathy [Thyroid Normal, No Nodules] : the thyroid was normal and there were no nodules present [No Respiratory Distress] : no respiratory distress  [Clear to Auscultation] : lungs were clear to auscultation bilaterally [No Accessory Muscle Use] : no accessory muscle use [Normal Rate] : normal rate  [Regular Rhythm] : with a regular rhythm [Normal S1, S2] : normal S1 and S2 [No Murmur] : no murmur heard [No CVA Tenderness] : no CVA  tenderness [No Spinal Tenderness] : no spinal tenderness [No Joint Swelling] : no joint swelling [Grossly Normal Strength/Tone] : grossly normal strength/tone [No Rash] : no rash [Normal Gait] : normal gait [Coordination Grossly Intact] : coordination grossly intact [No Focal Deficits] : no focal deficits [Deep Tendon Reflexes (DTR)] : deep tendon reflexes were 2+ and symmetric [Speech Grossly Normal] : speech grossly normal [Normal Affect] : the affect was normal [Normal Mood] : the mood was normal [Normal Insight/Judgement] : insight and judgment were intact

## 2019-01-28 NOTE — ASSESSMENT
[FreeTextEntry1] : Patient with chest pains, recommended he see cardiology, however ended up getting the appointment there after this follow up. Recommended he start an aspirin 81mg daily for cardioprotection and continue the pravastatin 20mg.  CHeck lipid profile and LFT's explained to him that depending on te lipid profile we may need to increase intensity of the statin therapy.  Recommended e come back next month so we can review the cardiology results.  \par Informed patient that I will be transferring to a different department with the [facility] and can no longer be their primary.  There will be a new physician taking my place.

## 2019-01-29 ENCOUNTER — NON-APPOINTMENT (OUTPATIENT)
Age: 49
End: 2019-01-29

## 2019-01-29 ENCOUNTER — APPOINTMENT (OUTPATIENT)
Dept: CARDIOLOGY | Facility: CLINIC | Age: 49
End: 2019-01-29
Payer: MEDICARE

## 2019-01-29 ENCOUNTER — MESSAGE (OUTPATIENT)
Age: 49
End: 2019-01-29

## 2019-01-29 VITALS
HEIGHT: 69 IN | WEIGHT: 207 LBS | SYSTOLIC BLOOD PRESSURE: 145 MMHG | DIASTOLIC BLOOD PRESSURE: 75 MMHG | BODY MASS INDEX: 30.66 KG/M2 | HEART RATE: 67 BPM | OXYGEN SATURATION: 99 %

## 2019-01-29 VITALS — SYSTOLIC BLOOD PRESSURE: 126 MMHG | DIASTOLIC BLOOD PRESSURE: 88 MMHG

## 2019-01-29 DIAGNOSIS — R07.9 CHEST PAIN, UNSPECIFIED: ICD-10-CM

## 2019-01-29 DIAGNOSIS — R94.31 ABNORMAL ELECTROCARDIOGRAM [ECG] [EKG]: ICD-10-CM

## 2019-01-29 DIAGNOSIS — Z78.9 OTHER SPECIFIED HEALTH STATUS: ICD-10-CM

## 2019-01-29 DIAGNOSIS — E78.5 HYPERLIPIDEMIA, UNSPECIFIED: ICD-10-CM

## 2019-01-29 LAB
APPEARANCE: CLEAR
BACTERIA: NEGATIVE
BASOPHILS # BLD AUTO: 0.04 K/UL
BASOPHILS NFR BLD AUTO: 0.8 %
BILIRUB UR QL STRIP: NORMAL
BILIRUBIN URINE: NEGATIVE
BLOOD URINE: NEGATIVE
CHOLEST SERPL-MCNC: 208 MG/DL
CHOLEST/HDLC SERPL: 4.5 RATIO
CLARITY UR: CLEAR
COLLECTION METHOD: NORMAL
COLOR: YELLOW
EOSINOPHIL # BLD AUTO: 0.2 K/UL
EOSINOPHIL NFR BLD AUTO: 4.1 %
GLUCOSE QUALITATIVE U: NEGATIVE MG/DL
GLUCOSE UR-MCNC: NORMAL
HBA1C MFR BLD HPLC: 6.1 %
HCG UR QL: 1 EU/DL
HCT VFR BLD CALC: 43.9 %
HDLC SERPL-MCNC: 46 MG/DL
HGB BLD-MCNC: 14 G/DL
HGB UR QL STRIP.AUTO: NORMAL
HYALINE CASTS: 2 /LPF
IMM GRANULOCYTES NFR BLD AUTO: 0 %
KETONES UR-MCNC: NORMAL
KETONES URINE: NEGATIVE
LDLC SERPL CALC-MCNC: 144 MG/DL
LEUKOCYTE ESTERASE UR QL STRIP: NORMAL
LEUKOCYTE ESTERASE URINE: NEGATIVE
LYMPHOCYTES # BLD AUTO: 1.47 K/UL
LYMPHOCYTES NFR BLD AUTO: 30.2 %
MAN DIFF?: NORMAL
MCHC RBC-ENTMCNC: 29.4 PG
MCHC RBC-ENTMCNC: 31.9 GM/DL
MCV RBC AUTO: 92 FL
MICROSCOPIC-UA: NORMAL
MONOCYTES # BLD AUTO: 0.47 K/UL
MONOCYTES NFR BLD AUTO: 9.7 %
NEUTROPHILS # BLD AUTO: 2.68 K/UL
NEUTROPHILS NFR BLD AUTO: 55.2 %
NITRITE UR QL STRIP: NORMAL
NITRITE URINE: NEGATIVE
PH UR STRIP: 5.5
PH URINE: 5.5
PLATELET # BLD AUTO: 217 K/UL
PROT UR STRIP-MCNC: NORMAL
PROTEIN URINE: 30 MG/DL
RBC # BLD: 4.77 M/UL
RBC # FLD: 14.6 %
RED BLOOD CELLS URINE: 2 /HPF
SAVE SPECIMEN: NORMAL
SP GR UR STRIP: 1.03
SPECIFIC GRAVITY URINE: 1.03
SQUAMOUS EPITHELIAL CELLS: 1 /HPF
TRIGL SERPL-MCNC: 92 MG/DL
UROBILINOGEN URINE: 1 MG/DL
WBC # FLD AUTO: 4.86 K/UL
WHITE BLOOD CELLS URINE: 14 /HPF

## 2019-01-29 PROCEDURE — 93000 ELECTROCARDIOGRAM COMPLETE: CPT

## 2019-01-29 PROCEDURE — 99204 OFFICE O/P NEW MOD 45 MIN: CPT

## 2019-01-29 RX ORDER — ATORVASTATIN CALCIUM 20 MG/1
20 TABLET, FILM COATED ORAL
Qty: 30 | Refills: 3 | Status: ACTIVE | COMMUNITY
Start: 2019-01-29 | End: 1900-01-01

## 2019-01-29 RX ORDER — PRAVASTATIN SODIUM 20 MG/1
20 TABLET ORAL
Qty: 90 | Refills: 3 | Status: DISCONTINUED | COMMUNITY
Start: 2018-07-25 | End: 2019-01-29

## 2019-01-31 ENCOUNTER — MESSAGE (OUTPATIENT)
Age: 49
End: 2019-01-31

## 2019-01-31 DIAGNOSIS — R03.0 ELEVATED BLOOD-PRESSURE READING, W/OUT DIAGNOSIS OF HYPERTENSION: ICD-10-CM

## 2019-02-01 NOTE — REASON FOR VISIT
[Initial Evaluation] : an initial evaluation of [Chest Pain] : chest pain [FreeTextEntry1] : Patient referred here by his PMD after an episode of chest pain concerning for unstable angina.

## 2019-02-01 NOTE — PHYSICAL EXAM
[General Appearance - Well Developed] : well developed [Normal Appearance] : normal appearance [Well Groomed] : well groomed [General Appearance - Well Nourished] : well nourished [No Deformities] : no deformities [General Appearance - In No Acute Distress] : no acute distress [Conjunctiva] : the conjunctiva were normal in both eyes [PERRL] : pupils were equal in size, round, and reactive to light [EOM Intact] : extraocular movements were intact [5th Left ICS - MCL] : palpated at the 5th LICS in the midclavicular line [Normal] : normal [No Precordial Heave] : no precordial heave was noted [Normal Rate] : normal [Rhythm Regular] : regular [Normal S1] : normal S1 [Normal S2] : normal S2 [No Gallop] : no gallop heard [No Murmur] : no murmurs heard [1+] : left 1+ [2+] : left 2+ [No Pitting Edema] : no pitting edema present [Oriented To Time, Place, And Person] : oriented to person, place, and time [Impaired Insight] : insight and judgment were intact [Affect] : the affect was normal [Mood] : the mood was normal [No Anxiety] : not feeling anxious [Normal Oral Mucosa] : normal oral mucosa [No Oral Pallor] : no oral pallor [No Oral Cyanosis] : no oral cyanosis [Normal Oropharynx] : normal oropharynx [Normal Jugular Venous A Waves Present] : normal jugular venous A waves present [Normal Jugular Venous V Waves Present] : normal jugular venous V waves present [No Jugular Venous Bui A Waves] : no jugular venous bui A waves [] : no respiratory distress [Respiration, Rhythm And Depth] : normal respiratory rhythm and effort [Exaggerated Use Of Accessory Muscles For Inspiration] : no accessory muscle use [Auscultation Breath Sounds / Voice Sounds] : lungs were clear to auscultation bilaterally [Bowel Sounds] : normal bowel sounds [Abdomen Soft] : soft [Abdomen Tenderness] : non-tender [Abnormal Walk] : normal gait [Gait - Sufficient For Exercise Testing] : the gait was sufficient for exercise testing [Nail Clubbing] : no clubbing of the fingernails [Cyanosis, Localized] : no localized cyanosis [Skin Color & Pigmentation] : normal skin color and pigmentation [No Venous Stasis] : no venous stasis [No Xanthoma] : no  xanthoma was observed [Yellow Sclera (Icteric)] : no scleral icterus was seen [Right Carotid Bruit] : no bruit heard over the right carotid [Left Carotid Bruit] : no bruit heard over the left carotid [FreeTextEntry1] : multiple diffuse keloids

## 2019-02-01 NOTE — DISCUSSION/SUMMARY
[FreeTextEntry1] : Patient is a very pleasant 48 year-old gentleman with intermediate pretest probability for ischemic heart disease who presents with atypical chest pain.\par \par Patient has early repolarization at baseline, which would make ST changes difficult to interpret.\par Will check stress echo to evaluate for structural heart disease, assess exercise capacity, and perform ischemic evaluation.\par \par Follow-up to be determined based on test results.

## 2019-02-27 ENCOUNTER — APPOINTMENT (OUTPATIENT)
Dept: INTERNAL MEDICINE | Facility: CLINIC | Age: 49
End: 2019-02-27

## 2019-04-01 ENCOUNTER — APPOINTMENT (OUTPATIENT)
Dept: CARDIOLOGY | Facility: CLINIC | Age: 49
End: 2019-04-01

## 2019-05-10 ENCOUNTER — APPOINTMENT (OUTPATIENT)
Dept: INTERNAL MEDICINE | Facility: CLINIC | Age: 49
End: 2019-05-10

## 2019-10-14 ENCOUNTER — APPOINTMENT (OUTPATIENT)
Dept: DERMATOLOGY | Facility: CLINIC | Age: 49
End: 2019-10-14
Payer: MEDICARE

## 2019-10-14 DIAGNOSIS — L30.9 DERMATITIS, UNSPECIFIED: ICD-10-CM

## 2019-10-14 PROCEDURE — 99214 OFFICE O/P EST MOD 30 MIN: CPT

## 2019-10-14 RX ORDER — TRIAMCINOLONE ACETONIDE 1 MG/G
0.1 OINTMENT TOPICAL
Qty: 1 | Refills: 1 | Status: ACTIVE | COMMUNITY
Start: 2019-10-14 | End: 1900-01-01

## 2019-10-28 ENCOUNTER — APPOINTMENT (OUTPATIENT)
Dept: DERMATOLOGY | Facility: CLINIC | Age: 49
End: 2019-10-28

## 2020-02-04 ENCOUNTER — RX RENEWAL (OUTPATIENT)
Age: 50
End: 2020-02-04

## 2020-02-04 RX ORDER — KETOCONAZOLE 20 MG/G
2 CREAM TOPICAL TWICE DAILY
Qty: 60 | Refills: 1 | Status: ACTIVE | COMMUNITY
Start: 2017-06-19 | End: 1900-01-01

## 2020-02-12 RX ORDER — KETOCONAZOLE 20.5 MG/ML
2 SHAMPOO, SUSPENSION TOPICAL
Qty: 1 | Refills: 9 | Status: ACTIVE | COMMUNITY
Start: 2017-06-19 | End: 1900-01-01

## 2020-03-06 ENCOUNTER — APPOINTMENT (OUTPATIENT)
Dept: INTERNAL MEDICINE | Facility: CLINIC | Age: 50
End: 2020-03-06

## 2020-04-06 ENCOUNTER — TRANSCRIPTION ENCOUNTER (OUTPATIENT)
Age: 50
End: 2020-04-06

## 2020-04-07 ENCOUNTER — INPATIENT (INPATIENT)
Facility: HOSPITAL | Age: 50
LOS: 8 days | Discharge: HOME | DRG: 488 | End: 2020-04-16
Attending: SURGERY | Admitting: SURGERY
Payer: COMMERCIAL

## 2020-04-07 VITALS — WEIGHT: 209.44 LBS | HEIGHT: 71 IN

## 2020-04-07 DIAGNOSIS — Z98.890 OTHER SPECIFIED POSTPROCEDURAL STATES: Chronic | ICD-10-CM
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
## 2020-04-07 LAB
ABO RH CONFIRMATION: SIGNIFICANT CHANGE UP
ALBUMIN SERPL ELPH-MCNC: 4.1 G/DL — SIGNIFICANT CHANGE UP (ref 3.3–5.2)
ALP SERPL-CCNC: 154 U/L — HIGH (ref 40–120)
ALT FLD-CCNC: 31 U/L — SIGNIFICANT CHANGE UP
ANION GAP SERPL CALC-SCNC: 19 MMOL/L — HIGH (ref 5–17)
APTT BLD: 27.3 SEC — LOW (ref 27.5–36.3)
AST SERPL-CCNC: 33 U/L — SIGNIFICANT CHANGE UP
BASE EXCESS BLDV CALC-SCNC: -3.4 MMOL/L — LOW (ref -2–2)
BASOPHILS # BLD AUTO: 0.04 K/UL — SIGNIFICANT CHANGE UP (ref 0–0.2)
BASOPHILS NFR BLD AUTO: 0.4 % — SIGNIFICANT CHANGE UP (ref 0–2)
BILIRUB SERPL-MCNC: 0.7 MG/DL — SIGNIFICANT CHANGE UP (ref 0.4–2)
BLD GP AB SCN SERPL QL: SIGNIFICANT CHANGE UP
BUN SERPL-MCNC: 18 MG/DL — SIGNIFICANT CHANGE UP (ref 8–20)
CALCIUM SERPL-MCNC: 9.6 MG/DL — SIGNIFICANT CHANGE UP (ref 8.6–10.2)
CHLORIDE SERPL-SCNC: 102 MMOL/L — SIGNIFICANT CHANGE UP (ref 98–107)
CK MB CFR SERPL CALC: 2 NG/ML — SIGNIFICANT CHANGE UP (ref 0–6.7)
CK SERPL-CCNC: 207 U/L — HIGH (ref 30–200)
CO2 SERPL-SCNC: 18 MMOL/L — LOW (ref 22–29)
CREAT SERPL-MCNC: 1.49 MG/DL — HIGH (ref 0.5–1.3)
EOSINOPHIL # BLD AUTO: 0.06 K/UL — SIGNIFICANT CHANGE UP (ref 0–0.5)
EOSINOPHIL NFR BLD AUTO: 0.6 % — SIGNIFICANT CHANGE UP (ref 0–6)
ETHANOL SERPL-MCNC: <10 MG/DL — SIGNIFICANT CHANGE UP
GAS PNL BLDV: SIGNIFICANT CHANGE UP
GLUCOSE SERPL-MCNC: 163 MG/DL — HIGH (ref 70–99)
HCO3 BLDV-SCNC: 21 MMOL/L — SIGNIFICANT CHANGE UP (ref 21–29)
HCT VFR BLD CALC: 40.3 % — SIGNIFICANT CHANGE UP (ref 39–50)
HGB BLD-MCNC: 13.4 G/DL — SIGNIFICANT CHANGE UP (ref 13–17)
IMM GRANULOCYTES NFR BLD AUTO: 1.6 % — HIGH (ref 0–1.5)
INR BLD: 1 RATIO — SIGNIFICANT CHANGE UP (ref 0.88–1.16)
LIDOCAIN IGE QN: 21 U/L — LOW (ref 22–51)
LYMPHOCYTES # BLD AUTO: 1.76 K/UL — SIGNIFICANT CHANGE UP (ref 1–3.3)
LYMPHOCYTES # BLD AUTO: 18.3 % — SIGNIFICANT CHANGE UP (ref 13–44)
MCHC RBC-ENTMCNC: 29.7 PG — SIGNIFICANT CHANGE UP (ref 27–34)
MCHC RBC-ENTMCNC: 33.3 GM/DL — SIGNIFICANT CHANGE UP (ref 32–36)
MCV RBC AUTO: 89.4 FL — SIGNIFICANT CHANGE UP (ref 80–100)
MONOCYTES # BLD AUTO: 0.74 K/UL — SIGNIFICANT CHANGE UP (ref 0–0.9)
MONOCYTES NFR BLD AUTO: 7.7 % — SIGNIFICANT CHANGE UP (ref 2–14)
NEUTROPHILS # BLD AUTO: 6.89 K/UL — SIGNIFICANT CHANGE UP (ref 1.8–7.4)
NEUTROPHILS NFR BLD AUTO: 71.4 % — SIGNIFICANT CHANGE UP (ref 43–77)
PCO2 BLDV: 32 MMHG — LOW (ref 35–50)
PH BLDV: 7.41 — SIGNIFICANT CHANGE UP (ref 7.32–7.43)
PLATELET # BLD AUTO: 210 K/UL — SIGNIFICANT CHANGE UP (ref 150–400)
PO2 BLDV: 40 MMHG — SIGNIFICANT CHANGE UP (ref 25–45)
POTASSIUM SERPL-MCNC: 3.5 MMOL/L — SIGNIFICANT CHANGE UP (ref 3.5–5.3)
POTASSIUM SERPL-SCNC: 3.5 MMOL/L — SIGNIFICANT CHANGE UP (ref 3.5–5.3)
PROT SERPL-MCNC: 7.3 G/DL — SIGNIFICANT CHANGE UP (ref 6.6–8.7)
PROTHROM AB SERPL-ACNC: 11.3 SEC — SIGNIFICANT CHANGE UP (ref 10–12.9)
RBC # BLD: 4.51 M/UL — SIGNIFICANT CHANGE UP (ref 4.2–5.8)
RBC # FLD: 13.6 % — SIGNIFICANT CHANGE UP (ref 10.3–14.5)
SAO2 % BLDV: 79 % — SIGNIFICANT CHANGE UP
SARS-COV-2 RNA SPEC QL NAA+PROBE: SIGNIFICANT CHANGE UP
SODIUM SERPL-SCNC: 139 MMOL/L — SIGNIFICANT CHANGE UP (ref 135–145)
WBC # BLD: 9.64 K/UL — SIGNIFICANT CHANGE UP (ref 3.8–10.5)
WBC # FLD AUTO: 9.64 K/UL — SIGNIFICANT CHANGE UP (ref 3.8–10.5)

## 2020-04-07 PROCEDURE — 28575 TREAT FOOT DISLOCATION: CPT | Mod: 82,RT

## 2020-04-07 PROCEDURE — 20692 APPL MLTPLN UNI EXT FIXJ SYS: CPT | Mod: 82,59,RT

## 2020-04-07 PROCEDURE — 99291 CRITICAL CARE FIRST HOUR: CPT

## 2020-04-07 PROCEDURE — 71260 CT THORAX DX C+: CPT | Mod: 26

## 2020-04-07 PROCEDURE — 73562 X-RAY EXAM OF KNEE 3: CPT | Mod: 26,RT

## 2020-04-07 PROCEDURE — 72170 X-RAY EXAM OF PELVIS: CPT | Mod: 26

## 2020-04-07 PROCEDURE — 28445 OPTX TALUS FRACTURE: CPT | Mod: 82,RT

## 2020-04-07 PROCEDURE — 27848 TREAT ANKLE DISLOCATION: CPT | Mod: 82,RT

## 2020-04-07 PROCEDURE — 28415 OPTX CALCANEAL FRACTURE: CPT | Mod: 82,RT

## 2020-04-07 PROCEDURE — 73610 X-RAY EXAM OF ANKLE: CPT | Mod: 26,RT

## 2020-04-07 PROCEDURE — 70450 CT HEAD/BRAIN W/O DYE: CPT | Mod: 26

## 2020-04-07 PROCEDURE — 27524 TREAT KNEECAP FRACTURE: CPT | Mod: 82,RT

## 2020-04-07 PROCEDURE — 27814 TREATMENT OF ANKLE FRACTURE: CPT | Mod: 82,RT

## 2020-04-07 PROCEDURE — 75635 CT ANGIO ABDOMINAL ARTERIES: CPT | Mod: 26

## 2020-04-07 PROCEDURE — 72125 CT NECK SPINE W/O DYE: CPT | Mod: 26

## 2020-04-07 PROCEDURE — 74177 CT ABD & PELVIS W/CONTRAST: CPT | Mod: 26

## 2020-04-07 PROCEDURE — 25605 CLTX DST RDL FX/EPHYS SEP W/: CPT | Mod: RT

## 2020-04-07 PROCEDURE — 71045 X-RAY EXAM CHEST 1 VIEW: CPT | Mod: 26

## 2020-04-07 RX ORDER — HYDROMORPHONE HYDROCHLORIDE 2 MG/ML
0.5 INJECTION INTRAMUSCULAR; INTRAVENOUS; SUBCUTANEOUS
Refills: 0 | Status: DISCONTINUED | OUTPATIENT
Start: 2020-04-08 | End: 2020-04-08

## 2020-04-07 RX ORDER — FENTANYL CITRATE 50 UG/ML
100 INJECTION INTRAVENOUS ONCE
Refills: 0 | Status: DISCONTINUED | OUTPATIENT
Start: 2020-04-07 | End: 2020-04-07

## 2020-04-07 RX ORDER — SODIUM CHLORIDE 9 MG/ML
1000 INJECTION, SOLUTION INTRAVENOUS
Refills: 0 | Status: DISCONTINUED | OUTPATIENT
Start: 2020-04-08 | End: 2020-04-08

## 2020-04-07 RX ORDER — CEFAZOLIN SODIUM 1 G
2000 VIAL (EA) INJECTION ONCE
Refills: 0 | Status: COMPLETED | OUTPATIENT
Start: 2020-04-07 | End: 2020-04-07

## 2020-04-07 RX ORDER — GENTAMICIN SULFATE 40 MG/ML
400 VIAL (ML) INJECTION ONCE
Refills: 0 | Status: COMPLETED | OUTPATIENT
Start: 2020-04-07 | End: 2020-04-07

## 2020-04-07 RX ORDER — SODIUM CHLORIDE 9 MG/ML
100 INJECTION INTRAMUSCULAR; INTRAVENOUS; SUBCUTANEOUS ONCE
Refills: 0 | Status: COMPLETED | OUTPATIENT
Start: 2020-04-07 | End: 2020-04-07

## 2020-04-07 RX ORDER — TETANUS TOXOID, REDUCED DIPHTHERIA TOXOID AND ACELLULAR PERTUSSIS VACCINE, ADSORBED 5; 2.5; 8; 8; 2.5 [IU]/.5ML; [IU]/.5ML; UG/.5ML; UG/.5ML; UG/.5ML
0.5 SUSPENSION INTRAMUSCULAR ONCE
Refills: 0 | Status: COMPLETED | OUTPATIENT
Start: 2020-04-07 | End: 2020-04-07

## 2020-04-07 RX ORDER — ONDANSETRON 8 MG/1
4 TABLET, FILM COATED ORAL ONCE
Refills: 0 | Status: DISCONTINUED | OUTPATIENT
Start: 2020-04-08 | End: 2020-04-08

## 2020-04-07 RX ADMIN — SODIUM CHLORIDE 100 MILLILITER(S): 9 INJECTION INTRAMUSCULAR; INTRAVENOUS; SUBCUTANEOUS at 18:45

## 2020-04-07 RX ADMIN — FENTANYL CITRATE 100 MICROGRAM(S): 50 INJECTION INTRAVENOUS at 18:52

## 2020-04-07 RX ADMIN — TETANUS TOXOID, REDUCED DIPHTHERIA TOXOID AND ACELLULAR PERTUSSIS VACCINE, ADSORBED 0.5 MILLILITER(S): 5; 2.5; 8; 8; 2.5 SUSPENSION INTRAMUSCULAR at 20:14

## 2020-04-07 RX ADMIN — Medication 100 MILLIGRAM(S): at 18:52

## 2020-04-07 RX ADMIN — Medication 500 MILLIGRAM(S): at 19:09

## 2020-04-07 NOTE — ED PROVIDER NOTE - ATTENDING CONTRIBUTION TO CARE
Dr. Wells: I have personally seen and examined this patient at the bedside. I have fully participated in the care of this patient. I have reviewed all pertinent clinical information, including history, physical exam, plan and the Resident's note and agree except as noted. HPI above as by me. PE above as by me.
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
## 2020-04-07 NOTE — ED PROVIDER NOTE - PHYSICAL EXAMINATION
Constitutional: Well-developed well nourished male in no acute distress  HEENT: Head is normocephalic, maxillofacial structures stable, no blood or discharge from nares or oral cavity, no pyle sign / raccoon eyes, EOMI b/l, pupils 2 mm round and reactive to light b/l, no active drainage or redness  Neck: cervical collar in place, trachea midline  Respiratory: Breath sounds CTA b/l respirations are unlabored, no accessory muscle use, no conversational dyspnea  Cardiovascular: Regular rate & rhythm, +S1, S2  Chest: Chest wall is non-tender to palpation, no subQ emphysema or crepitus palpated  Gastrointestinal: Abdomen soft, non-tender, non-distended, no rebound tenderness / guarding, no ecchymosis or external signs of abdominal trauma  Extremities: Deformity to : R wrist, R knee, R ankle;  Large, gaping, grossly contaminated wound overlying R knee; Large gaping wound overlying R medial ankle; R ankle severely deformed with lateral displacement of foot compared to tib/fib  Pelvis: stable  Vascular: 2+ radial, femoral, and DP pulses b/l. 2+ radial pulse R wrist.  R wrist splinted;    Neurological: GCS: 15 (4/5/6). A&O x 3; no gross sensory / motor / coordination deficits  Musculoskeletal: 5/5 strength of upper and lower extremities b/l  Back: no C/T/LS spine tenderness to palpation, no step-offs; abrasion overlying R upper gluteus
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
## 2020-04-08 ENCOUNTER — TRANSCRIPTION ENCOUNTER (OUTPATIENT)
Age: 50
End: 2020-04-08

## 2020-04-08 LAB
ANION GAP SERPL CALC-SCNC: 14 MMOL/L — SIGNIFICANT CHANGE UP (ref 5–17)
ANION GAP SERPL CALC-SCNC: 17 MMOL/L — SIGNIFICANT CHANGE UP (ref 5–17)
BASOPHILS # BLD AUTO: 0.01 K/UL — SIGNIFICANT CHANGE UP (ref 0–0.2)
BASOPHILS NFR BLD AUTO: 0.1 % — SIGNIFICANT CHANGE UP (ref 0–2)
BUN SERPL-MCNC: 17 MG/DL — SIGNIFICANT CHANGE UP (ref 8–20)
BUN SERPL-MCNC: 20 MG/DL — SIGNIFICANT CHANGE UP (ref 8–20)
CALCIUM SERPL-MCNC: 8.4 MG/DL — LOW (ref 8.6–10.2)
CALCIUM SERPL-MCNC: 8.7 MG/DL — SIGNIFICANT CHANGE UP (ref 8.6–10.2)
CHLORIDE SERPL-SCNC: 102 MMOL/L — SIGNIFICANT CHANGE UP (ref 98–107)
CHLORIDE SERPL-SCNC: 103 MMOL/L — SIGNIFICANT CHANGE UP (ref 98–107)
CO2 SERPL-SCNC: 20 MMOL/L — LOW (ref 22–29)
CO2 SERPL-SCNC: 20 MMOL/L — LOW (ref 22–29)
CREAT SERPL-MCNC: 1.41 MG/DL — HIGH (ref 0.5–1.3)
CREAT SERPL-MCNC: 1.59 MG/DL — HIGH (ref 0.5–1.3)
EOSINOPHIL # BLD AUTO: 0 K/UL — SIGNIFICANT CHANGE UP (ref 0–0.5)
EOSINOPHIL NFR BLD AUTO: 0 % — SIGNIFICANT CHANGE UP (ref 0–6)
GLUCOSE SERPL-MCNC: 165 MG/DL — HIGH (ref 70–99)
GLUCOSE SERPL-MCNC: 167 MG/DL — HIGH (ref 70–99)
HCT VFR BLD CALC: 34.9 % — LOW (ref 39–50)
HGB BLD-MCNC: 11.5 G/DL — LOW (ref 13–17)
IMM GRANULOCYTES NFR BLD AUTO: 0.4 % — SIGNIFICANT CHANGE UP (ref 0–1.5)
LACTATE SERPL-SCNC: 2.6 MMOL/L — HIGH (ref 0.5–2)
LACTATE SERPL-SCNC: 2.7 MMOL/L — HIGH (ref 0.5–2)
LACTATE SERPL-SCNC: 3.5 MMOL/L — HIGH (ref 0.5–2)
LYMPHOCYTES # BLD AUTO: 0.61 K/UL — LOW (ref 1–3.3)
LYMPHOCYTES # BLD AUTO: 3.7 % — LOW (ref 13–44)
MAGNESIUM SERPL-MCNC: 1.5 MG/DL — LOW (ref 1.8–2.6)
MCHC RBC-ENTMCNC: 30 PG — SIGNIFICANT CHANGE UP (ref 27–34)
MCHC RBC-ENTMCNC: 33 GM/DL — SIGNIFICANT CHANGE UP (ref 32–36)
MCV RBC AUTO: 91.1 FL — SIGNIFICANT CHANGE UP (ref 80–100)
MONOCYTES # BLD AUTO: 1.51 K/UL — HIGH (ref 0–0.9)
MONOCYTES NFR BLD AUTO: 9.2 % — SIGNIFICANT CHANGE UP (ref 2–14)
NEUTROPHILS # BLD AUTO: 14.16 K/UL — HIGH (ref 1.8–7.4)
NEUTROPHILS NFR BLD AUTO: 86.6 % — HIGH (ref 43–77)
PHOSPHATE SERPL-MCNC: 3.1 MG/DL — SIGNIFICANT CHANGE UP (ref 2.4–4.7)
PLATELET # BLD AUTO: 173 K/UL — SIGNIFICANT CHANGE UP (ref 150–400)
POTASSIUM SERPL-MCNC: 4.4 MMOL/L — SIGNIFICANT CHANGE UP (ref 3.5–5.3)
POTASSIUM SERPL-MCNC: 4.4 MMOL/L — SIGNIFICANT CHANGE UP (ref 3.5–5.3)
POTASSIUM SERPL-SCNC: 4.4 MMOL/L — SIGNIFICANT CHANGE UP (ref 3.5–5.3)
POTASSIUM SERPL-SCNC: 4.4 MMOL/L — SIGNIFICANT CHANGE UP (ref 3.5–5.3)
RBC # BLD: 3.83 M/UL — LOW (ref 4.2–5.8)
RBC # FLD: 14 % — SIGNIFICANT CHANGE UP (ref 10.3–14.5)
SODIUM SERPL-SCNC: 137 MMOL/L — SIGNIFICANT CHANGE UP (ref 135–145)
SODIUM SERPL-SCNC: 139 MMOL/L — SIGNIFICANT CHANGE UP (ref 135–145)
WBC # BLD: 16.36 K/UL — HIGH (ref 3.8–10.5)
WBC # FLD AUTO: 16.36 K/UL — HIGH (ref 3.8–10.5)

## 2020-04-08 PROCEDURE — 73700 CT LOWER EXTREMITY W/O DYE: CPT | Mod: 26,RT,76

## 2020-04-08 PROCEDURE — 73200 CT UPPER EXTREMITY W/O DYE: CPT | Mod: 26,RT

## 2020-04-08 RX ORDER — SODIUM CHLORIDE 9 MG/ML
1000 INJECTION, SOLUTION INTRAVENOUS ONCE
Refills: 0 | Status: COMPLETED | OUTPATIENT
Start: 2020-04-08 | End: 2020-04-08

## 2020-04-08 RX ORDER — HYDROMORPHONE HYDROCHLORIDE 2 MG/ML
1 INJECTION INTRAMUSCULAR; INTRAVENOUS; SUBCUTANEOUS
Refills: 0 | Status: DISCONTINUED | OUTPATIENT
Start: 2020-04-08 | End: 2020-04-09

## 2020-04-08 RX ORDER — MAGNESIUM SULFATE 500 MG/ML
2 VIAL (ML) INJECTION ONCE
Refills: 0 | Status: COMPLETED | OUTPATIENT
Start: 2020-04-08 | End: 2020-04-08

## 2020-04-08 RX ORDER — ACETAMINOPHEN 500 MG
1000 TABLET ORAL ONCE
Refills: 0 | Status: COMPLETED | OUTPATIENT
Start: 2020-04-08 | End: 2020-04-08

## 2020-04-08 RX ORDER — HYDROMORPHONE HYDROCHLORIDE 2 MG/ML
1 INJECTION INTRAMUSCULAR; INTRAVENOUS; SUBCUTANEOUS
Refills: 0 | Status: DISCONTINUED | OUTPATIENT
Start: 2020-04-08 | End: 2020-04-08

## 2020-04-08 RX ORDER — CEFAZOLIN SODIUM 1 G
2000 VIAL (EA) INJECTION ONCE
Refills: 0 | Status: COMPLETED | OUTPATIENT
Start: 2020-04-08 | End: 2020-04-08

## 2020-04-08 RX ORDER — SODIUM CHLORIDE 9 MG/ML
1000 INJECTION, SOLUTION INTRAVENOUS
Refills: 0 | Status: DISCONTINUED | OUTPATIENT
Start: 2020-04-08 | End: 2020-04-08

## 2020-04-08 RX ORDER — IBUPROFEN 200 MG
400 TABLET ORAL EVERY 6 HOURS
Refills: 0 | Status: DISCONTINUED | OUTPATIENT
Start: 2020-04-08 | End: 2020-04-09

## 2020-04-08 RX ORDER — CEFAZOLIN SODIUM 1 G
2000 VIAL (EA) INJECTION EVERY 8 HOURS
Refills: 0 | Status: COMPLETED | OUTPATIENT
Start: 2020-04-08 | End: 2020-04-10

## 2020-04-08 RX ORDER — ENOXAPARIN SODIUM 100 MG/ML
40 INJECTION SUBCUTANEOUS DAILY
Refills: 0 | Status: DISCONTINUED | OUTPATIENT
Start: 2020-04-08 | End: 2020-04-10

## 2020-04-08 RX ORDER — SENNA PLUS 8.6 MG/1
2 TABLET ORAL AT BEDTIME
Refills: 0 | Status: DISCONTINUED | OUTPATIENT
Start: 2020-04-08 | End: 2020-04-16

## 2020-04-08 RX ORDER — SODIUM CHLORIDE 9 MG/ML
1000 INJECTION, SOLUTION INTRAVENOUS
Refills: 0 | Status: DISCONTINUED | OUTPATIENT
Start: 2020-04-08 | End: 2020-04-10

## 2020-04-08 RX ORDER — OXYCODONE HYDROCHLORIDE 5 MG/1
5 TABLET ORAL EVERY 4 HOURS
Refills: 0 | Status: DISCONTINUED | OUTPATIENT
Start: 2020-04-08 | End: 2020-04-09

## 2020-04-08 RX ORDER — ACETAMINOPHEN 500 MG
650 TABLET ORAL EVERY 6 HOURS
Refills: 0 | Status: DISCONTINUED | OUTPATIENT
Start: 2020-04-08 | End: 2020-04-10

## 2020-04-08 RX ORDER — POLYETHYLENE GLYCOL 3350 17 G/17G
17 POWDER, FOR SOLUTION ORAL DAILY
Refills: 0 | Status: DISCONTINUED | OUTPATIENT
Start: 2020-04-08 | End: 2020-04-10

## 2020-04-08 RX ORDER — CEFAZOLIN SODIUM 1 G
VIAL (EA) INJECTION
Refills: 0 | Status: COMPLETED | OUTPATIENT
Start: 2020-04-08 | End: 2020-04-10

## 2020-04-08 RX ADMIN — OXYCODONE HYDROCHLORIDE 5 MILLIGRAM(S): 5 TABLET ORAL at 11:11

## 2020-04-08 RX ADMIN — Medication 650 MILLIGRAM(S): at 20:18

## 2020-04-08 RX ADMIN — OXYCODONE HYDROCHLORIDE 5 MILLIGRAM(S): 5 TABLET ORAL at 06:46

## 2020-04-08 RX ADMIN — HYDROMORPHONE HYDROCHLORIDE 1 MILLIGRAM(S): 2 INJECTION INTRAMUSCULAR; INTRAVENOUS; SUBCUTANEOUS at 13:03

## 2020-04-08 RX ADMIN — HYDROMORPHONE HYDROCHLORIDE 1 MILLIGRAM(S): 2 INJECTION INTRAMUSCULAR; INTRAVENOUS; SUBCUTANEOUS at 02:05

## 2020-04-08 RX ADMIN — HYDROMORPHONE HYDROCHLORIDE 0.5 MILLIGRAM(S): 2 INJECTION INTRAMUSCULAR; INTRAVENOUS; SUBCUTANEOUS at 00:45

## 2020-04-08 RX ADMIN — HYDROMORPHONE HYDROCHLORIDE 1 MILLIGRAM(S): 2 INJECTION INTRAMUSCULAR; INTRAVENOUS; SUBCUTANEOUS at 08:38

## 2020-04-08 RX ADMIN — SODIUM CHLORIDE 1000 MILLILITER(S): 9 INJECTION, SOLUTION INTRAVENOUS at 08:41

## 2020-04-08 RX ADMIN — SODIUM CHLORIDE 1000 MILLILITER(S): 9 INJECTION, SOLUTION INTRAVENOUS at 15:43

## 2020-04-08 RX ADMIN — SODIUM CHLORIDE 125 MILLILITER(S): 9 INJECTION, SOLUTION INTRAVENOUS at 13:33

## 2020-04-08 RX ADMIN — SENNA PLUS 2 TABLET(S): 8.6 TABLET ORAL at 22:08

## 2020-04-08 RX ADMIN — OXYCODONE HYDROCHLORIDE 5 MILLIGRAM(S): 5 TABLET ORAL at 17:43

## 2020-04-08 RX ADMIN — Medication 50 GRAM(S): at 08:37

## 2020-04-08 RX ADMIN — ENOXAPARIN SODIUM 40 MILLIGRAM(S): 100 INJECTION SUBCUTANEOUS at 12:40

## 2020-04-08 RX ADMIN — Medication 100 MILLIGRAM(S): at 16:18

## 2020-04-08 RX ADMIN — Medication 50 GRAM(S): at 11:11

## 2020-04-08 RX ADMIN — SODIUM CHLORIDE 1000 MILLILITER(S): 9 INJECTION, SOLUTION INTRAVENOUS at 11:40

## 2020-04-08 RX ADMIN — Medication 100 MILLIGRAM(S): at 08:37

## 2020-04-08 RX ADMIN — Medication 100 MILLIGRAM(S): at 01:01

## 2020-04-08 RX ADMIN — SODIUM CHLORIDE 1000 MILLILITER(S): 9 INJECTION, SOLUTION INTRAVENOUS at 17:43

## 2020-04-08 RX ADMIN — Medication 650 MILLIGRAM(S): at 13:03

## 2020-04-08 RX ADMIN — Medication 400 MILLIGRAM(S): at 02:00

## 2020-04-08 RX ADMIN — Medication 650 MILLIGRAM(S): at 08:41

## 2020-04-08 NOTE — DISCHARGE NOTE PROVIDER - CARE PROVIDER_API CALL
Lan Sebastian ()  Orthopaedic Surgery  [address] [place] [address]  Phone: [phone]  Fax: [phone]  Follow Up Time:     aPty Haas)  Orthopaedic Surgery; Surgery of the Hand  [address] [place] [address]  Phone: [phone]  Fax: [phone]  Follow Up Time: Lan Sebastian ()  Orthopaedic Surgery  [address] [place] [address]  Phone: [phone]  Fax: [phone]  Follow Up Time:     Paty Haas)  Orthopaedic Surgery; Surgery of the Hand  [address] [place] [address]  Phone: [phone]  Fax: [phone]  Follow Up Time:     Cj Lloyd)  Orthopaedic Surgery  [address] [place] [address]  Phone: [phone]  Fax: [phone]  Follow Up Time:
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
## 2020-04-08 NOTE — PROGRESS NOTE ADULT - SUBJECTIVE AND OBJECTIVE BOX
HPI/OVERNIGHT EVENTS:  No acute events overnight. Pt taken to the OR emergently for a washout, reduction and ex-fix placement of LLE along with a reduction and splint of the RUE. Pt has no complaints. Has voided post-operatively, c-collar cleared by the trauma team, and pain well controlled. Denies any complaints.    MEDICATIONS  (STANDING):  acetaminophen   Tablet .. 650 milliGRAM(s) Oral every 6 hours  ceFAZolin   IVPB      ceFAZolin   IVPB 2000 milliGRAM(s) IV Intermittent every 8 hours  enoxaparin Injectable 40 milliGRAM(s) SubCutaneous daily  HYDROmorphone  Injectable 1 milliGRAM(s) IV Push every 10 minutes  lactated ringers. 1000 milliLiter(s) (75 mL/Hr) IV Continuous <Continuous>    MEDICATIONS  (PRN):  HYDROmorphone  Injectable 1 milliGRAM(s) IV Push every 3 hours PRN breakthrough pain  ibuprofen  Tablet. 400 milliGRAM(s) Oral every 6 hours PRN Moderate Pain (4 - 6)  ondansetron Injectable 4 milliGRAM(s) IV Push once PRN Nausea and/or Vomiting  oxyCODONE    IR 5 milliGRAM(s) Oral every 4 hours PRN Severe Pain (7 - 10)      Vital Signs Last 24 Hrs  T(C): 36.6 (08 Apr 2020 03:05), Max: 36.8 (08 Apr 2020 00:35)  T(F): 97.9 (08 Apr 2020 03:05), Max: 98.2 (08 Apr 2020 00:35)  HR: 94 (08 Apr 2020 03:05) (89 - 94)  BP: 125/61 (08 Apr 2020 03:05) (111/74 - 131/76)  BP(mean): --  RR: 20 (08 Apr 2020 03:05) (20 - 20)  SpO2: 100% (08 Apr 2020 03:05) (100% - 100%)    gen: nad, a&ox3  cv: rrr  resp: nonlabored breathing  gi: soft, nd, nttp  msk: RUE in soft splint with sling. RLE with ex-fix.   vasc: 2+ DP.   skin: R flank abrasion      I&O's Detail    07 Apr 2020 07:01  -  08 Apr 2020 04:19  --------------------------------------------------------  IN:  Total IN: 0 mL    OUT:    Voided: 400 mL  Total OUT: 400 mL    Total NET: -400 mL          LABS:                        13.4   9.64  )-----------( 210      ( 07 Apr 2020 19:08 )             40.3     04-07    139  |  102  |  18.0  ----------------------------<  163<H>  3.5   |  18.0<L>  |  1.49<H>    Ca    9.6      07 Apr 2020 19:08    TPro  7.3  /  Alb  4.1  /  TBili  0.7  /  DBili  x   /  AST  33  /  ALT  31  /  AlkPhos  154<H>  04-07    PT/INR - ( 07 Apr 2020 19:08 )   PT: 11.3 sec;   INR: 1.00 ratio         PTT - ( 07 Apr 2020 19:08 )  PTT:27.3 sec
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
## 2020-04-08 NOTE — DISCHARGE NOTE PROVIDER - NSDCMRMEDTOKEN_GEN_ALL_CORE_FT
Percocet 5/325 325 mg-5 mg oral tablet: 1-2 tab(s) orally every 4-6 hours, As Needed -for severe pain MDD:8 acetaminophen 325 mg oral tablet: 2 tab(s) orally every 6 hours, As needed, Temp greater or equal to 38.5C (101.3F), Mild Pain (1 - 3)  bacitracin 500 units/g topical ointment: 1 application topically 2 times a day  bisacodyl 10 mg rectal suppository: 1 suppository(ies) rectal once a day  enoxaparin: 30 milligram(s) subcutaneously 2 times a day  lactulose 10 g/15 mL oral syrup: 30 milliliter(s) orally once a day  oxyCODONE 10 mg oral tablet: 1 tab(s) orally every 3 hours, As needed, Moderate Pain (4 - 6)  oxyCODONE 5 mg oral tablet: 1 tab(s) orally every 3 hours, As needed, Mild Pain (1 - 3)  pantoprazole 40 mg oral delayed release tablet: 1 tab(s) orally once a day (before a meal)  Percocet 5/325 325 mg-5 mg oral tablet: 1-2 tab(s) orally every 4-6 hours, As Needed -for severe pain MDD:8  senna oral tablet: 2 tab(s) orally once a day (at bedtime)

## 2020-04-08 NOTE — PHYSICAL THERAPY INITIAL EVALUATION ADULT - CRITERIA FOR SKILLED THERAPEUTIC INTERVENTIONS
impairments found/functional limitations in following categories/rehab potential/anticipated discharge recommendation

## 2020-04-08 NOTE — CHART NOTE - NSCHARTNOTEFT_GEN_A_CORE
04/08/2020 Patient Cj Bryant was fit and del a long leg hinged knee orthosis with suspension sleeves to prevent migration. All went without incident  [facility]   [phone]

## 2020-04-08 NOTE — DISCHARGE NOTE PROVIDER - PROVIDER TOKENS
PROVIDER:[TOKEN:[[id number]]],PROVIDER:[TOKEN:[[id number]]] PROVIDER:[TOKEN:[[id number]]],PROVIDER:[TOKEN:[[id number]]],PROVIDER:[TOKEN:[[id number]]]

## 2020-04-08 NOTE — PHYSICAL THERAPY INITIAL EVALUATION ADULT - DIAGNOSIS, PT EVAL
Decreased mobility/function; Right distal radial fracture, Right patella fracture, Right ankle fracture/dislocation

## 2020-04-08 NOTE — PROGRESS NOTE ADULT - SUBJECTIVE AND OBJECTIVE BOX
Patient seen and examined at bedside. comfortable in bed, pain controlled with pain meds. Denies fever/chills, SOB/chest pain, abdominal pain, numbness/tingling. no complaints.    Vital Signs Last 24 Hrs  T(C): 37.1 (08 Apr 2020 06:41), Max: 37.4 (08 Apr 2020 04:10)  T(F): 98.8 (08 Apr 2020 06:41), Max: 99.3 (08 Apr 2020 04:10)  HR: 103 (08 Apr 2020 06:41) (89 - 103)  BP: 120/75 (08 Apr 2020 06:41) (111/74 - 131/76)  BP(mean): --  RR: 20 (08 Apr 2020 06:41) (18 - 20)  SpO2: 100% (08 Apr 2020 06:41) (100% - 100%)    RLE: Ex fix noted, in place. Dressings C/D/I. Prevenas noted, functioning. KI noted, in place. replaced after exam. + EHL/FHL. Ext warm, cap refill brisk. SILT. Compartments soft throughout.  RUE: Sling noted, in place. splint C/D/I. + ROM phalanges. SILT. Ext warm cap refill brisk.    A/P: 44 y.o M s/p right knee I&D, patella tendon repair, right ankle open reduction of talus/ex fix placement POD #1  - NWB RUE/RLE  - f/u CT  - DVTP  - ancef x 48 hours  - [facility] care primary team

## 2020-04-08 NOTE — CHART NOTE - NSCHARTNOTEFT_GEN_A_CORE
04/08/2020   This document will serve as a proof of delivery for the long leg hinged knee brace that was dispensed to Cj Bryant      _____________________________________________
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
## 2020-04-08 NOTE — DISCHARGE NOTE PROVIDER - CARE PROVIDERS DIRECT ADDRESSES
,DirectAddress_Unknown,DirectAddress_Unknown ,DirectAddress_Unknown,DirectAddress_Unknown,[email or web address]
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
## 2020-04-08 NOTE — DISCHARGE NOTE PROVIDER - NSDCFUADDINST_GEN_ALL_CORE_FT
Keep splint on right upper extremity dry and intact. No weight bearing right upper extremity.  F/U with Dr Haas for right wrist in 1 week.  No weight bearing right lower extremity.  Knee adela brace at all times right lower extremity, no range of motion to right knee.  Pain meds as prescribed  DVT prophylaxis Lovenox as prescribed  F/U with Dr Sebastian in 1 week Keep splint on right upper extremity dry and intact. No weight bearing right upper extremity.  F/U with Dr Haas for right wrist in 1 week.  No weight bearing right lower extremity.  Knee adela brace at all times right lower extremity, no range of motion to right knee.  Pain meds as prescribed  DVT prophylaxis Lovenox as prescribed  F/U with Dr Sebastian in 1 week  F/U with Dr Lloyd for right ankle in 1 week

## 2020-04-09 LAB
ANION GAP SERPL CALC-SCNC: 14 MMOL/L — SIGNIFICANT CHANGE UP (ref 5–17)
ANISOCYTOSIS BLD QL: SLIGHT — SIGNIFICANT CHANGE UP
BASOPHILS # BLD AUTO: 0 K/UL — SIGNIFICANT CHANGE UP (ref 0–0.2)
BASOPHILS # BLD AUTO: 0.02 K/UL — SIGNIFICANT CHANGE UP (ref 0–0.2)
BASOPHILS NFR BLD AUTO: 0 % — SIGNIFICANT CHANGE UP (ref 0–2)
BASOPHILS NFR BLD AUTO: 0.1 % — SIGNIFICANT CHANGE UP (ref 0–2)
BUN SERPL-MCNC: 17 MG/DL — SIGNIFICANT CHANGE UP (ref 8–20)
CALCIUM SERPL-MCNC: 8.6 MG/DL — SIGNIFICANT CHANGE UP (ref 8.6–10.2)
CHLORIDE SERPL-SCNC: 104 MMOL/L — SIGNIFICANT CHANGE UP (ref 98–107)
CO2 SERPL-SCNC: 20 MMOL/L — LOW (ref 22–29)
CREAT SERPL-MCNC: 1.22 MG/DL — SIGNIFICANT CHANGE UP (ref 0.5–1.3)
ELLIPTOCYTES BLD QL SMEAR: SLIGHT — SIGNIFICANT CHANGE UP
EOSINOPHIL # BLD AUTO: 0 K/UL — SIGNIFICANT CHANGE UP (ref 0–0.5)
EOSINOPHIL # BLD AUTO: 0.01 K/UL — SIGNIFICANT CHANGE UP (ref 0–0.5)
EOSINOPHIL NFR BLD AUTO: 0 % — SIGNIFICANT CHANGE UP (ref 0–6)
EOSINOPHIL NFR BLD AUTO: 0.1 % — SIGNIFICANT CHANGE UP (ref 0–6)
GIANT PLATELETS BLD QL SMEAR: PRESENT — SIGNIFICANT CHANGE UP
GLUCOSE SERPL-MCNC: 125 MG/DL — HIGH (ref 70–99)
HCT VFR BLD CALC: 26 % — LOW (ref 39–50)
HCT VFR BLD CALC: 26.7 % — LOW (ref 39–50)
HGB BLD-MCNC: 8.7 G/DL — LOW (ref 13–17)
HGB BLD-MCNC: 8.9 G/DL — LOW (ref 13–17)
HYPOCHROMIA BLD QL: SLIGHT — SIGNIFICANT CHANGE UP
IMM GRANULOCYTES NFR BLD AUTO: 0.6 % — SIGNIFICANT CHANGE UP (ref 0–1.5)
LACTATE SERPL-SCNC: 1.1 MMOL/L — SIGNIFICANT CHANGE UP (ref 0.5–2)
LYMPHOCYTES # BLD AUTO: 0.5 K/UL — LOW (ref 1–3.3)
LYMPHOCYTES # BLD AUTO: 1.26 K/UL — SIGNIFICANT CHANGE UP (ref 1–3.3)
LYMPHOCYTES # BLD AUTO: 4.4 % — LOW (ref 13–44)
LYMPHOCYTES # BLD AUTO: 8.9 % — LOW (ref 13–44)
MACROCYTES BLD QL: SLIGHT — SIGNIFICANT CHANGE UP
MAGNESIUM SERPL-MCNC: 2.5 MG/DL — SIGNIFICANT CHANGE UP (ref 1.6–2.6)
MANUAL SMEAR VERIFICATION: SIGNIFICANT CHANGE UP
MCHC RBC-ENTMCNC: 29.6 PG — SIGNIFICANT CHANGE UP (ref 27–34)
MCHC RBC-ENTMCNC: 30 PG — SIGNIFICANT CHANGE UP (ref 27–34)
MCHC RBC-ENTMCNC: 32.6 GM/DL — SIGNIFICANT CHANGE UP (ref 32–36)
MCHC RBC-ENTMCNC: 34.2 GM/DL — SIGNIFICANT CHANGE UP (ref 32–36)
MCV RBC AUTO: 87.5 FL — SIGNIFICANT CHANGE UP (ref 80–100)
MCV RBC AUTO: 90.8 FL — SIGNIFICANT CHANGE UP (ref 80–100)
MICROCYTES BLD QL: SLIGHT — SIGNIFICANT CHANGE UP
MONOCYTES # BLD AUTO: 0.79 K/UL — SIGNIFICANT CHANGE UP (ref 0–0.9)
MONOCYTES # BLD AUTO: 1.39 K/UL — HIGH (ref 0–0.9)
MONOCYTES NFR BLD AUTO: 7 % — SIGNIFICANT CHANGE UP (ref 2–14)
MONOCYTES NFR BLD AUTO: 9.9 % — SIGNIFICANT CHANGE UP (ref 2–14)
MYELOCYTES NFR BLD: 4.4 % — HIGH (ref 0–0)
NEUTROPHILS # BLD AUTO: 11.33 K/UL — HIGH (ref 1.8–7.4)
NEUTROPHILS # BLD AUTO: 9.08 K/UL — HIGH (ref 1.8–7.4)
NEUTROPHILS NFR BLD AUTO: 78.9 % — HIGH (ref 43–77)
NEUTROPHILS NFR BLD AUTO: 80.4 % — HIGH (ref 43–77)
NEUTS BAND # BLD: 1.8 % — SIGNIFICANT CHANGE UP (ref 0–8)
PHOSPHATE SERPL-MCNC: 2.6 MG/DL — SIGNIFICANT CHANGE UP (ref 2.4–4.7)
PLAT MORPH BLD: NORMAL — SIGNIFICANT CHANGE UP
PLATELET # BLD AUTO: 130 K/UL — LOW (ref 150–400)
PLATELET # BLD AUTO: 131 K/UL — LOW (ref 150–400)
PLATELET COUNT - ESTIMATE: ABNORMAL
POIKILOCYTOSIS BLD QL AUTO: SLIGHT — SIGNIFICANT CHANGE UP
POTASSIUM SERPL-MCNC: 4.8 MMOL/L — SIGNIFICANT CHANGE UP (ref 3.5–5.3)
POTASSIUM SERPL-SCNC: 4.8 MMOL/L — SIGNIFICANT CHANGE UP (ref 3.5–5.3)
RBC # BLD: 2.94 M/UL — LOW (ref 4.2–5.8)
RBC # BLD: 2.97 M/UL — LOW (ref 4.2–5.8)
RBC # FLD: 13.7 % — SIGNIFICANT CHANGE UP (ref 10.3–14.5)
RBC # FLD: 14 % — SIGNIFICANT CHANGE UP (ref 10.3–14.5)
RBC BLD AUTO: NORMAL — SIGNIFICANT CHANGE UP
SODIUM SERPL-SCNC: 138 MMOL/L — SIGNIFICANT CHANGE UP (ref 135–145)
TARGETS BLD QL SMEAR: SLIGHT — SIGNIFICANT CHANGE UP
VARIANT LYMPHS # BLD: 3.5 % — SIGNIFICANT CHANGE UP (ref 0–6)
WBC # BLD: 11.25 K/UL — HIGH (ref 3.8–10.5)
WBC # BLD: 14.09 K/UL — HIGH (ref 3.8–10.5)
WBC # FLD AUTO: 11.25 K/UL — HIGH (ref 3.8–10.5)
WBC # FLD AUTO: 14.09 K/UL — HIGH (ref 3.8–10.5)

## 2020-04-09 PROCEDURE — 99223 1ST HOSP IP/OBS HIGH 75: CPT

## 2020-04-09 RX ORDER — TRAMADOL HYDROCHLORIDE 50 MG/1
50 TABLET ORAL EVERY 6 HOURS
Refills: 0 | Status: DISCONTINUED | OUTPATIENT
Start: 2020-04-09 | End: 2020-04-12

## 2020-04-09 RX ORDER — OXYCODONE HYDROCHLORIDE 5 MG/1
5 TABLET ORAL EVERY 6 HOURS
Refills: 0 | Status: DISCONTINUED | OUTPATIENT
Start: 2020-04-09 | End: 2020-04-12

## 2020-04-09 RX ORDER — OXYCODONE HYDROCHLORIDE 5 MG/1
10 TABLET ORAL EVERY 6 HOURS
Refills: 0 | Status: DISCONTINUED | OUTPATIENT
Start: 2020-04-09 | End: 2020-04-12

## 2020-04-09 RX ORDER — TRAMADOL HYDROCHLORIDE 50 MG/1
25 TABLET ORAL EVERY 6 HOURS
Refills: 0 | Status: DISCONTINUED | OUTPATIENT
Start: 2020-04-09 | End: 2020-04-12

## 2020-04-09 RX ADMIN — ENOXAPARIN SODIUM 40 MILLIGRAM(S): 100 INJECTION SUBCUTANEOUS at 11:55

## 2020-04-09 RX ADMIN — OXYCODONE HYDROCHLORIDE 5 MILLIGRAM(S): 5 TABLET ORAL at 08:39

## 2020-04-09 RX ADMIN — Medication 650 MILLIGRAM(S): at 02:50

## 2020-04-09 RX ADMIN — Medication 650 MILLIGRAM(S): at 08:43

## 2020-04-09 RX ADMIN — HYDROMORPHONE HYDROCHLORIDE 1 MILLIGRAM(S): 2 INJECTION INTRAMUSCULAR; INTRAVENOUS; SUBCUTANEOUS at 05:36

## 2020-04-09 RX ADMIN — SODIUM CHLORIDE 75 MILLILITER(S): 9 INJECTION, SOLUTION INTRAVENOUS at 16:23

## 2020-04-09 RX ADMIN — Medication 100 MILLIGRAM(S): at 08:43

## 2020-04-09 RX ADMIN — Medication 100 MILLIGRAM(S): at 00:14

## 2020-04-09 RX ADMIN — SODIUM CHLORIDE 75 MILLILITER(S): 9 INJECTION, SOLUTION INTRAVENOUS at 11:48

## 2020-04-09 RX ADMIN — Medication 650 MILLIGRAM(S): at 21:23

## 2020-04-09 RX ADMIN — SENNA PLUS 2 TABLET(S): 8.6 TABLET ORAL at 21:26

## 2020-04-09 RX ADMIN — Medication 100 MILLIGRAM(S): at 16:22

## 2020-04-09 RX ADMIN — OXYCODONE HYDROCHLORIDE 5 MILLIGRAM(S): 5 TABLET ORAL at 21:26

## 2020-04-09 RX ADMIN — Medication 650 MILLIGRAM(S): at 16:22

## 2020-04-09 RX ADMIN — POLYETHYLENE GLYCOL 3350 17 GRAM(S): 17 POWDER, FOR SOLUTION ORAL at 11:56

## 2020-04-09 RX ADMIN — OXYCODONE HYDROCHLORIDE 5 MILLIGRAM(S): 5 TABLET ORAL at 11:54

## 2020-04-09 NOTE — PROGRESS NOTE ADULT - SUBJECTIVE AND OBJECTIVE BOX
JANES ALDRICH    [id number]    History:  The patient is status post right wrist closed reduction, right knee I&D with patella tendon repair, right ankle open reduction of talus with application of exfix RLE, POD #2. Patient is doing well. The patient's pain is controlled using the prescribed pain medications. The patient is participating in physical therapy. Denies nausea, vomiting, chest pain, shortness of breath, abdominal pain or fever. No new complaints. No acute motor or sensory changes are reported.    Vital Signs Last 24 Hrs  T(C): 37.1 (08 Apr 2020 20:52), Max: 37.1 (08 Apr 2020 20:52)  T(F): 98.8 (08 Apr 2020 20:52), Max: 98.8 (08 Apr 2020 20:52)  HR: 94 (09 Apr 2020 05:37) (89 - 104)  BP: 134/77 (09 Apr 2020 05:37) (109/69 - 149/84)  BP(mean): --  RR: 16 (09 Apr 2020 05:37) (15 - 22)  SpO2: 99% (09 Apr 2020 05:37) (99% - 100%)  I&O's Summary    08 Apr 2020 07:01  -  09 Apr 2020 07:00  --------------------------------------------------------  IN: 7050 mL / OUT: 3425 mL / NET: 3625 mL                              8.7    11.25 )-----------( 131      ( 09 Apr 2020 06:05 )             26.7     04-09    138  |  104  |  17.0  ----------------------------<  125<H>  4.8   |  20.0<L>  |  1.22    Ca    8.6      09 Apr 2020 06:05  Phos  2.6     04-09  Mg     2.5     04-09    TPro  7.3  /  Alb  4.1  /  TBili  0.7  /  DBili  x   /  AST  33  /  ALT  31  /  AlkPhos  154<H>  04-07      MEDICATIONS  (STANDING):  acetaminophen   Tablet .. 650 milliGRAM(s) Oral every 6 hours  ceFAZolin   IVPB      ceFAZolin   IVPB 2000 milliGRAM(s) IV Intermittent every 8 hours  enoxaparin Injectable 40 milliGRAM(s) SubCutaneous daily  multiple electrolytes Injection Type 1 1000 milliLiter(s) (125 mL/Hr) IV Continuous <Continuous>  senna 2 Tablet(s) Oral at bedtime    MEDICATIONS  (PRN):  HYDROmorphone  Injectable 1 milliGRAM(s) IV Push every 3 hours PRN breakthrough pain  ibuprofen  Tablet. 400 milliGRAM(s) Oral every 6 hours PRN Moderate Pain (4 - 6)  oxyCODONE    IR 5 milliGRAM(s) Oral every 4 hours PRN Severe Pain (7 - 10)  polyethylene glycol 3350 17 Gram(s) Oral daily PRN Constipation      Physical exam: Lying in bed in NAD, awake and alert  Right upper extremity- Splint intact and well fitting. +ROM fingers, limited secondary to pain. Sensation intact distally. Brisk cap refill.  Right lower extremity- The prevena vac dressings are clean, dry and intact and functioning. Knee prevena became unplugged this morning but is not hooked up and functioning with a good seel. No output in knee cannister. Approx 30cc bloody output in ankle cannister. +swelling knee and lower leg. Compartments soft and compressible. Pin sites dry and clean. No wound erythema, discharge, drainage is noted. Calf soft. No calf tenderness. Sensation to light touch is grossly intact distally. Motor function distally is 5/5. No foot drop. +EHL/FHL. 2+ dorsalis pedis pulse. Capillary refill is less than 2 seconds. No cyanosis.    Primary Orthopedic Assessment:  • S/P CR right wrist/R knee I&D, patella tendon repair/right talus CR and ExFix, POD#2    Plan:   - Continue Prevena Vac dressings until POD#4  - Dressing changed at pin sites with xeroform, gauze and cling  • DVT prophylaxis as prescribed- Lovenox, including use of compression devices and ankle pumps  • Continue physical therapy  • Non-weight bearing of RUE and RLE  • Continue splint as applied RUE and Long brace right knee  • Elevation of the splinted extremity  • Pain control as clinically indicated  • Incentive spirometry encouraged  - Continue care per trauma team   • Discharge planning JANES ALDRICH    [id number]    History:  The patient is status post right wrist closed reduction, right knee I&D with patella tendon repair, right ankle open reduction of talus with application of exfix RLE, POD #2. Patient is doing well. The patient's pain is controlled using the prescribed pain medications. The patient is participating in physical therapy. Denies nausea, vomiting, chest pain, shortness of breath, abdominal pain or fever. No new complaints. No acute motor or sensory changes are reported.    Vital Signs Last 24 Hrs  T(C): 37.1 (08 Apr 2020 20:52), Max: 37.1 (08 Apr 2020 20:52)  T(F): 98.8 (08 Apr 2020 20:52), Max: 98.8 (08 Apr 2020 20:52)  HR: 94 (09 Apr 2020 05:37) (89 - 104)  BP: 134/77 (09 Apr 2020 05:37) (109/69 - 149/84)  BP(mean): --  RR: 16 (09 Apr 2020 05:37) (15 - 22)  SpO2: 99% (09 Apr 2020 05:37) (99% - 100%)  I&O's Summary    08 Apr 2020 07:01  -  09 Apr 2020 07:00  --------------------------------------------------------  IN: 7050 mL / OUT: 3425 mL / NET: 3625 mL                              8.7    11.25 )-----------( 131      ( 09 Apr 2020 06:05 )             26.7     04-09    138  |  104  |  17.0  ----------------------------<  125<H>  4.8   |  20.0<L>  |  1.22    Ca    8.6      09 Apr 2020 06:05  Phos  2.6     04-09  Mg     2.5     04-09    TPro  7.3  /  Alb  4.1  /  TBili  0.7  /  DBili  x   /  AST  33  /  ALT  31  /  AlkPhos  154<H>  04-07      MEDICATIONS  (STANDING):  acetaminophen   Tablet .. 650 milliGRAM(s) Oral every 6 hours  ceFAZolin   IVPB      ceFAZolin   IVPB 2000 milliGRAM(s) IV Intermittent every 8 hours  enoxaparin Injectable 40 milliGRAM(s) SubCutaneous daily  multiple electrolytes Injection Type 1 1000 milliLiter(s) (125 mL/Hr) IV Continuous <Continuous>  senna 2 Tablet(s) Oral at bedtime    MEDICATIONS  (PRN):  HYDROmorphone  Injectable 1 milliGRAM(s) IV Push every 3 hours PRN breakthrough pain  ibuprofen  Tablet. 400 milliGRAM(s) Oral every 6 hours PRN Moderate Pain (4 - 6)  oxyCODONE    IR 5 milliGRAM(s) Oral every 4 hours PRN Severe Pain (7 - 10)  polyethylene glycol 3350 17 Gram(s) Oral daily PRN Constipation      Physical exam: Lying in bed in NAD, awake and alert  Right upper extremity- Splint intact and well fitting. +ROM fingers, limited secondary to pain. Sensation intact distally. Brisk cap refill.  Right lower extremity- The prevena vac dressings are clean, dry and intact and functioning. Knee prevena became unplugged this morning but is not hooked up and functioning with a good seel. No output in knee cannister. Approx 30cc bloody output in ankle cannister. +swelling knee and lower leg. Compartments soft and compressible. Pin sites dry and clean. No wound erythema, discharge, drainage is noted. Calf soft. No calf tenderness. Sensation to light touch is grossly intact distally. Motor function distally is 5/5. No foot drop. +EHL/FHL. 2+ dorsalis pedis pulse. Capillary refill is less than 2 seconds. No cyanosis.    Primary Orthopedic Assessment:  • S/P CR right wrist/R knee I&D, patella tendon repair/right talus CR and ExFix, POD#2    Plan:   - Continue Prevena Vac dressings until POD#4, then recheck wounds and place new prevena. Possible OR for repeat I&D of wounds depending on appearance.  - Dressing changed at pin sites with xeroform, gauze and cling  • DVT prophylaxis as prescribed- Lovenox, including use of compression devices and ankle pumps  • Continue physical therapy  • Non-weight bearing of RUE and RLE  • Continue splint as applied RUE and Marlene brace right knee  • Elevation of the splinted extremity  • Pain control as clinically indicated  • Incentive spirometry encouraged  - Continue care per trauma team   • Discharge planning JANES ALDRICH    [id number]    History:  The patient is status post right wrist closed reduction, right knee I&D with patella tendon repair, right ankle open reduction of talus with application of exfix RLE, POD #2. Patient is doing well. The patient's pain is controlled using the prescribed pain medications. The patient is participating in physical therapy. Denies nausea, vomiting, chest pain, shortness of breath, abdominal pain or fever. No new complaints. No acute motor or sensory changes are reported.    Vital Signs Last 24 Hrs  T(C): 37.1 (08 Apr 2020 20:52), Max: 37.1 (08 Apr 2020 20:52)  T(F): 98.8 (08 Apr 2020 20:52), Max: 98.8 (08 Apr 2020 20:52)  HR: 94 (09 Apr 2020 05:37) (89 - 104)  BP: 134/77 (09 Apr 2020 05:37) (109/69 - 149/84)  BP(mean): --  RR: 16 (09 Apr 2020 05:37) (15 - 22)  SpO2: 99% (09 Apr 2020 05:37) (99% - 100%)  I&O's Summary    08 Apr 2020 07:01  -  09 Apr 2020 07:00  --------------------------------------------------------  IN: 7050 mL / OUT: 3425 mL / NET: 3625 mL                              8.7    11.25 )-----------( 131      ( 09 Apr 2020 06:05 )             26.7     04-09    138  |  104  |  17.0  ----------------------------<  125<H>  4.8   |  20.0<L>  |  1.22    Ca    8.6      09 Apr 2020 06:05  Phos  2.6     04-09  Mg     2.5     04-09    TPro  7.3  /  Alb  4.1  /  TBili  0.7  /  DBili  x   /  AST  33  /  ALT  31  /  AlkPhos  154<H>  04-07      MEDICATIONS  (STANDING):  acetaminophen   Tablet .. 650 milliGRAM(s) Oral every 6 hours  ceFAZolin   IVPB      ceFAZolin   IVPB 2000 milliGRAM(s) IV Intermittent every 8 hours  enoxaparin Injectable 40 milliGRAM(s) SubCutaneous daily  multiple electrolytes Injection Type 1 1000 milliLiter(s) (125 mL/Hr) IV Continuous <Continuous>  senna 2 Tablet(s) Oral at bedtime    MEDICATIONS  (PRN):  HYDROmorphone  Injectable 1 milliGRAM(s) IV Push every 3 hours PRN breakthrough pain  ibuprofen  Tablet. 400 milliGRAM(s) Oral every 6 hours PRN Moderate Pain (4 - 6)  oxyCODONE    IR 5 milliGRAM(s) Oral every 4 hours PRN Severe Pain (7 - 10)  polyethylene glycol 3350 17 Gram(s) Oral daily PRN Constipation      Physical exam: Lying in bed in NAD, awake and alert  Right upper extremity- Splint intact and well fitting. +ROM fingers, limited secondary to pain. Sensation intact distally. Brisk cap refill.  Right lower extremity- The prevena vac dressings are clean, dry and intact and functioning. Knee prevena became unplugged this morning but is not hooked up and functioning with a good seel. No output in knee cannister. Approx 30cc bloody output in ankle cannister. +swelling knee and lower leg. Compartments soft and compressible. Pin sites dry and clean. No wound erythema, discharge, drainage is noted. Calf soft. No calf tenderness. Sensation to light touch is grossly intact distally. Motor function distally is 5/5. No foot drop. +EHL/FHL. 2+ dorsalis pedis pulse. Capillary refill is less than 2 seconds. No cyanosis.    Primary Orthopedic Assessment:  • S/P CR right wrist/R knee I&D, patella tendon repair/right talus CR and ExFix, POD#2    Plan:   - Continue Prevena Vac dressings until POD#4, then recheck wounds and place new prevena. Possible OR for repeat I&D of wounds depending on appearance.  - Dressing changed at pin sites with xeroform, gauze and cling  • DVT prophylaxis as prescribed- Lovenox, including use of compression devices and ankle pumps  • Continue physical therapy  • Non-weight bearing of RUE and RLE  • Continue splint as applied RUE and Marlene brace right knee  • Elevation of the splinted extremity  • Pain control as clinically indicated  • Incentive spirometry encouraged  - Continue care per trauma team   • Discharge planning   - D/W Dr Haas- current plan nonop for right distal radius fx and will f/u in office in 1 week   - D/W Dr Lloyd- continue exfix until wounds healed, f/u in the office  - D/W Dr Sebastian- will continue prevena vacs and wound check on pod#4

## 2020-04-09 NOTE — PROGRESS NOTE ADULT - SUBJECTIVE AND OBJECTIVE BOX
HPI/OVERNIGHT EVENTS:  No acute events overnight. Pain is well controlled. Worked with PT to strengthen his LLE. Tolerating a diet. States he hasn't had a bowel movement since admission.    MEDICATIONS  (STANDING):  acetaminophen   Tablet .. 650 milliGRAM(s) Oral every 6 hours  ceFAZolin   IVPB      ceFAZolin   IVPB 2000 milliGRAM(s) IV Intermittent every 8 hours  enoxaparin Injectable 40 milliGRAM(s) SubCutaneous daily  multiple electrolytes Injection Type 1 1000 milliLiter(s) (125 mL/Hr) IV Continuous <Continuous>  senna 2 Tablet(s) Oral at bedtime    MEDICATIONS  (PRN):  HYDROmorphone  Injectable 1 milliGRAM(s) IV Push every 3 hours PRN breakthrough pain  ibuprofen  Tablet. 400 milliGRAM(s) Oral every 6 hours PRN Moderate Pain (4 - 6)  oxyCODONE    IR 5 milliGRAM(s) Oral every 4 hours PRN Severe Pain (7 - 10)  polyethylene glycol 3350 17 Gram(s) Oral daily PRN Constipation      Vital Signs Last 24 Hrs  T(C): 37.1 (08 Apr 2020 20:52), Max: 37.4 (08 Apr 2020 04:10)  T(F): 98.8 (08 Apr 2020 20:52), Max: 99.3 (08 Apr 2020 04:10)  HR: 94 (08 Apr 2020 20:52) (89 - 103)  BP: 149/84 (08 Apr 2020 20:52) (109/69 - 149/84)  BP(mean): --  RR: 16 (08 Apr 2020 20:52) (15 - 22)  SpO2: 100% (08 Apr 2020 20:52) (100% - 100%)    gen: nad, a&ox3  cv: rrr  resp: nonlabored breathing  gi: soft, nd, nttp  msk: RLE in brace brought in brought orthodists. Prevena vac in place with good seal. Ex-fix in place with dressings being managed by ortho. RUE splinted and in a sling.  vasc: 2+ DP b/l      I&O's Detail    07 Apr 2020 07:01  -  08 Apr 2020 07:00  --------------------------------------------------------  IN:  Total IN: 0 mL    OUT:    Voided: 400 mL  Total OUT: 400 mL    Total NET: -400 mL      08 Apr 2020 07:01  -  09 Apr 2020 01:09  --------------------------------------------------------  IN:    IV PiggyBack: 200 mL    Lactated Ringers IV Bolus: 1000 mL    multiple electrolytes Injection Type 1: 1500 mL    multiple electrolytes Injection Type 1 Bolus: 3000 mL    Oral Fluid: 600 mL  Total IN: 6300 mL    OUT:    VAC (Vacuum Assisted Closure) System: 125 mL    Voided: 2800 mL  Total OUT: 2925 mL    Total NET: 3375 mL          LABS:                        11.5   16.36 )-----------( 173      ( 08 Apr 2020 05:27 )             34.9     04-08    137  |  103  |  20.0  ----------------------------<  167<H>  4.4   |  20.0<L>  |  1.59<H>    Ca    8.4<L>      08 Apr 2020 09:40  Phos  3.1     04-08  Mg     1.5     04-08    TPro  7.3  /  Alb  4.1  /  TBili  0.7  /  DBili  x   /  AST  33  /  ALT  31  /  AlkPhos  154<H>  04-07    PT/INR - ( 07 Apr 2020 19:08 )   PT: 11.3 sec;   INR: 1.00 ratio         PTT - ( 07 Apr 2020 19:08 )  PTT:27.3 sec
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
## 2020-04-10 LAB
ANION GAP SERPL CALC-SCNC: 10 MMOL/L — SIGNIFICANT CHANGE UP (ref 5–17)
ANION GAP SERPL CALC-SCNC: 14 MMOL/L — SIGNIFICANT CHANGE UP (ref 5–17)
BASOPHILS # BLD AUTO: 0.02 K/UL — SIGNIFICANT CHANGE UP (ref 0–0.2)
BASOPHILS NFR BLD AUTO: 0.2 % — SIGNIFICANT CHANGE UP (ref 0–2)
BUN SERPL-MCNC: 15 MG/DL — SIGNIFICANT CHANGE UP (ref 8–20)
BUN SERPL-MCNC: 15 MG/DL — SIGNIFICANT CHANGE UP (ref 8–20)
CALCIUM SERPL-MCNC: 8.5 MG/DL — LOW (ref 8.6–10.2)
CALCIUM SERPL-MCNC: 8.7 MG/DL — SIGNIFICANT CHANGE UP (ref 8.6–10.2)
CHLORIDE SERPL-SCNC: 100 MMOL/L — SIGNIFICANT CHANGE UP (ref 98–107)
CHLORIDE SERPL-SCNC: 102 MMOL/L — SIGNIFICANT CHANGE UP (ref 98–107)
CK MB CFR SERPL CALC: 1.8 NG/ML — SIGNIFICANT CHANGE UP (ref 0–6.7)
CK MB CFR SERPL CALC: 1.9 NG/ML — SIGNIFICANT CHANGE UP (ref 0–6.7)
CK SERPL-CCNC: 3342 U/L — HIGH (ref 30–200)
CK SERPL-CCNC: 3455 U/L — HIGH (ref 30–200)
CO2 SERPL-SCNC: 20 MMOL/L — LOW (ref 22–29)
CO2 SERPL-SCNC: 25 MMOL/L — SIGNIFICANT CHANGE UP (ref 22–29)
CREAT SERPL-MCNC: 1.34 MG/DL — HIGH (ref 0.5–1.3)
CREAT SERPL-MCNC: 1.53 MG/DL — HIGH (ref 0.5–1.3)
EOSINOPHIL # BLD AUTO: 0.05 K/UL — SIGNIFICANT CHANGE UP (ref 0–0.5)
EOSINOPHIL NFR BLD AUTO: 0.4 % — SIGNIFICANT CHANGE UP (ref 0–6)
GLUCOSE SERPL-MCNC: 116 MG/DL — HIGH (ref 70–99)
GLUCOSE SERPL-MCNC: 129 MG/DL — HIGH (ref 70–99)
HCT VFR BLD CALC: 22 % — LOW (ref 39–50)
HCT VFR BLD CALC: 23.9 % — LOW (ref 39–50)
HGB BLD-MCNC: 7.3 G/DL — LOW (ref 13–17)
HGB BLD-MCNC: 7.8 G/DL — LOW (ref 13–17)
IMM GRANULOCYTES NFR BLD AUTO: 0.6 % — SIGNIFICANT CHANGE UP (ref 0–1.5)
LYMPHOCYTES # BLD AUTO: 1.15 K/UL — SIGNIFICANT CHANGE UP (ref 1–3.3)
LYMPHOCYTES # BLD AUTO: 9.2 % — LOW (ref 13–44)
MAGNESIUM SERPL-MCNC: 2.2 MG/DL — SIGNIFICANT CHANGE UP (ref 1.6–2.6)
MCHC RBC-ENTMCNC: 29.7 PG — SIGNIFICANT CHANGE UP (ref 27–34)
MCHC RBC-ENTMCNC: 29.9 PG — SIGNIFICANT CHANGE UP (ref 27–34)
MCHC RBC-ENTMCNC: 32.6 GM/DL — SIGNIFICANT CHANGE UP (ref 32–36)
MCHC RBC-ENTMCNC: 33.2 GM/DL — SIGNIFICANT CHANGE UP (ref 32–36)
MCV RBC AUTO: 90.2 FL — SIGNIFICANT CHANGE UP (ref 80–100)
MCV RBC AUTO: 90.9 FL — SIGNIFICANT CHANGE UP (ref 80–100)
MONOCYTES # BLD AUTO: 1.07 K/UL — HIGH (ref 0–0.9)
MONOCYTES NFR BLD AUTO: 8.6 % — SIGNIFICANT CHANGE UP (ref 2–14)
NEUTROPHILS # BLD AUTO: 10.13 K/UL — HIGH (ref 1.8–7.4)
NEUTROPHILS NFR BLD AUTO: 81 % — HIGH (ref 43–77)
PHOSPHATE SERPL-MCNC: 2 MG/DL — LOW (ref 2.4–4.7)
PLATELET # BLD AUTO: 149 K/UL — LOW (ref 150–400)
PLATELET # BLD AUTO: 151 K/UL — SIGNIFICANT CHANGE UP (ref 150–400)
POTASSIUM SERPL-MCNC: 4.6 MMOL/L — SIGNIFICANT CHANGE UP (ref 3.5–5.3)
POTASSIUM SERPL-MCNC: 4.6 MMOL/L — SIGNIFICANT CHANGE UP (ref 3.5–5.3)
POTASSIUM SERPL-SCNC: 4.6 MMOL/L — SIGNIFICANT CHANGE UP (ref 3.5–5.3)
POTASSIUM SERPL-SCNC: 4.6 MMOL/L — SIGNIFICANT CHANGE UP (ref 3.5–5.3)
RBC # BLD: 2.44 M/UL — LOW (ref 4.2–5.8)
RBC # BLD: 2.63 M/UL — LOW (ref 4.2–5.8)
RBC # FLD: 13.7 % — SIGNIFICANT CHANGE UP (ref 10.3–14.5)
RBC # FLD: 13.7 % — SIGNIFICANT CHANGE UP (ref 10.3–14.5)
SODIUM SERPL-SCNC: 135 MMOL/L — SIGNIFICANT CHANGE UP (ref 135–145)
SODIUM SERPL-SCNC: 136 MMOL/L — SIGNIFICANT CHANGE UP (ref 135–145)
WBC # BLD: 12.19 K/UL — HIGH (ref 3.8–10.5)
WBC # BLD: 12.5 K/UL — HIGH (ref 3.8–10.5)
WBC # FLD AUTO: 12.19 K/UL — HIGH (ref 3.8–10.5)
WBC # FLD AUTO: 12.5 K/UL — HIGH (ref 3.8–10.5)

## 2020-04-10 PROCEDURE — 93970 EXTREMITY STUDY: CPT | Mod: 26

## 2020-04-10 PROCEDURE — 71045 X-RAY EXAM CHEST 1 VIEW: CPT | Mod: 26

## 2020-04-10 PROCEDURE — 99233 SBSQ HOSP IP/OBS HIGH 50: CPT

## 2020-04-10 RX ORDER — CEFAZOLIN SODIUM 1 G
2000 VIAL (EA) INJECTION EVERY 8 HOURS
Refills: 0 | Status: DISCONTINUED | OUTPATIENT
Start: 2020-04-10 | End: 2020-04-13

## 2020-04-10 RX ORDER — SODIUM CHLORIDE 9 MG/ML
1000 INJECTION, SOLUTION INTRAVENOUS ONCE
Refills: 0 | Status: COMPLETED | OUTPATIENT
Start: 2020-04-10 | End: 2020-04-10

## 2020-04-10 RX ORDER — CEFAZOLIN SODIUM 1 G
VIAL (EA) INJECTION
Refills: 0 | Status: DISCONTINUED | OUTPATIENT
Start: 2020-04-10 | End: 2020-04-13

## 2020-04-10 RX ORDER — ENOXAPARIN SODIUM 100 MG/ML
30 INJECTION SUBCUTANEOUS EVERY 12 HOURS
Refills: 0 | Status: DISCONTINUED | OUTPATIENT
Start: 2020-04-10 | End: 2020-04-16

## 2020-04-10 RX ORDER — SODIUM CHLORIDE 9 MG/ML
1000 INJECTION, SOLUTION INTRAVENOUS
Refills: 0 | Status: DISCONTINUED | OUTPATIENT
Start: 2020-04-10 | End: 2020-04-12

## 2020-04-10 RX ORDER — POLYETHYLENE GLYCOL 3350 17 G/17G
17 POWDER, FOR SOLUTION ORAL DAILY
Refills: 0 | Status: DISCONTINUED | OUTPATIENT
Start: 2020-04-10 | End: 2020-04-13

## 2020-04-10 RX ORDER — CEFAZOLIN SODIUM 1 G
2000 VIAL (EA) INJECTION ONCE
Refills: 0 | Status: COMPLETED | OUTPATIENT
Start: 2020-04-10 | End: 2020-04-10

## 2020-04-10 RX ORDER — CEFAZOLIN SODIUM 1 G
VIAL (EA) INJECTION
Refills: 0 | Status: DISCONTINUED | OUTPATIENT
Start: 2020-04-10 | End: 2020-04-10

## 2020-04-10 RX ORDER — ACETAMINOPHEN 500 MG
650 TABLET ORAL EVERY 6 HOURS
Refills: 0 | Status: DISCONTINUED | OUTPATIENT
Start: 2020-04-10 | End: 2020-04-12

## 2020-04-10 RX ADMIN — SODIUM CHLORIDE 125 MILLILITER(S): 9 INJECTION, SOLUTION INTRAVENOUS at 13:53

## 2020-04-10 RX ADMIN — OXYCODONE HYDROCHLORIDE 10 MILLIGRAM(S): 5 TABLET ORAL at 20:40

## 2020-04-10 RX ADMIN — Medication 100 MILLIGRAM(S): at 01:02

## 2020-04-10 RX ADMIN — OXYCODONE HYDROCHLORIDE 10 MILLIGRAM(S): 5 TABLET ORAL at 08:57

## 2020-04-10 RX ADMIN — SODIUM CHLORIDE 2000 MILLILITER(S): 9 INJECTION, SOLUTION INTRAVENOUS at 13:53

## 2020-04-10 RX ADMIN — SODIUM CHLORIDE 2000 MILLILITER(S): 9 INJECTION, SOLUTION INTRAVENOUS at 12:20

## 2020-04-10 RX ADMIN — Medication 10 MILLIGRAM(S): at 17:44

## 2020-04-10 RX ADMIN — Medication 100 MILLIGRAM(S): at 10:07

## 2020-04-10 RX ADMIN — Medication 650 MILLIGRAM(S): at 09:59

## 2020-04-10 RX ADMIN — Medication 100 MILLIGRAM(S): at 21:50

## 2020-04-10 RX ADMIN — Medication 650 MILLIGRAM(S): at 01:02

## 2020-04-10 RX ADMIN — POLYETHYLENE GLYCOL 3350 17 GRAM(S): 17 POWDER, FOR SOLUTION ORAL at 10:06

## 2020-04-10 RX ADMIN — ENOXAPARIN SODIUM 30 MILLIGRAM(S): 100 INJECTION SUBCUTANEOUS at 17:09

## 2020-04-10 RX ADMIN — OXYCODONE HYDROCHLORIDE 5 MILLIGRAM(S): 5 TABLET ORAL at 03:35

## 2020-04-10 RX ADMIN — SENNA PLUS 2 TABLET(S): 8.6 TABLET ORAL at 21:50

## 2020-04-10 RX ADMIN — SODIUM CHLORIDE 125 MILLILITER(S): 9 INJECTION, SOLUTION INTRAVENOUS at 11:07

## 2020-04-10 RX ADMIN — Medication 62.5 MILLIMOLE(S): at 12:20

## 2020-04-10 RX ADMIN — Medication 650 MILLIGRAM(S): at 13:58

## 2020-04-10 RX ADMIN — Medication 100 MILLIGRAM(S): at 15:47

## 2020-04-10 RX ADMIN — SODIUM CHLORIDE 75 MILLILITER(S): 9 INJECTION, SOLUTION INTRAVENOUS at 05:47

## 2020-04-10 RX ADMIN — OXYCODONE HYDROCHLORIDE 10 MILLIGRAM(S): 5 TABLET ORAL at 14:09

## 2020-04-10 RX ADMIN — TRAMADOL HYDROCHLORIDE 50 MILLIGRAM(S): 50 TABLET ORAL at 11:10

## 2020-04-10 NOTE — OCCUPATIONAL THERAPY INITIAL EVALUATION ADULT - PHYSICAL ASSIST/NONPHYSICAL ASSIST: SUPINE/SIT, REHAB EVAL
3 person assist; third person to assist with Right LE. Patient required assist to achieve and maintain upright trunk posture./verbal cues

## 2020-04-10 NOTE — PROGRESS NOTE ADULT - SUBJECTIVE AND OBJECTIVE BOX
INTERVAL HPI/OVERNIGHT EVENTS: mildly tachycardic overnight but stable clinically, H/H stable on repeat pm labs    SUBJECTIVE:  Patient evaluated at bedside and found resting comfortably in bed, nad. Continues to work with PT to strengthen his LLE. Tolerating a diet. Denies sob, chest pain, n/v, fever/chills.      MEDICATIONS  (STANDING):  acetaminophen   Tablet .. 650 milliGRAM(s) Oral every 6 hours  enoxaparin Injectable 40 milliGRAM(s) SubCutaneous daily  multiple electrolytes Injection Type 1 1000 milliLiter(s) (75 mL/Hr) IV Continuous <Continuous>  senna 2 Tablet(s) Oral at bedtime    MEDICATIONS  (PRN):  bisacodyl Suppository 10 milliGRAM(s) Rectal daily PRN Constipation  oxyCODONE    IR 5 milliGRAM(s) Oral every 6 hours PRN Severe Pain (7 - 10)  oxyCODONE    IR 10 milliGRAM(s) Oral every 6 hours PRN Breakthrough Pain  polyethylene glycol 3350 17 Gram(s) Oral daily PRN Constipation  traMADol 25 milliGRAM(s) Oral every 6 hours PRN Mild Pain (1 - 3)  traMADol 50 milliGRAM(s) Oral every 6 hours PRN Moderate Pain (4 - 6)      Vital Signs Last 24 Hrs  T(C): 37.9 (09 Apr 2020 23:50), Max: 37.9 (09 Apr 2020 23:50)  T(F): 100.2 (09 Apr 2020 23:50), Max: 100.2 (09 Apr 2020 23:50)  HR: 107 (09 Apr 2020 23:50) (94 - 114)  BP: 139/80 (09 Apr 2020 23:50) (121/66 - 147/84)  BP(mean): --  RR: 18 (09 Apr 2020 23:50) (16 - 24)  SpO2: 100% (09 Apr 2020 23:50) (99% - 100%)    PE  gen: nad, a&ox3  cv: rrr  resp: nonlabored breathing  gi: soft, nd, nttp  msk: RLE in brace brought in brought orthodists. Prevena vac in place with good seal. Ex-fix in place with dressings being managed by ortho. RUE splinted and in a sling.  vasc: 2+ DP b/l    I&O's Detail    08 Apr 2020 07:01  -  09 Apr 2020 07:00  --------------------------------------------------------  IN:    IV PiggyBack: 200 mL    Lactated Ringers IV Bolus: 1000 mL    multiple electrolytes Injection Type 1: 2375 mL    multiple electrolytes Injection Type 1 Bolus: 3000 mL    Oral Fluid: 600 mL  Total IN: 7175 mL    OUT:    VAC (Vacuum Assisted Closure) System: 125 mL    Voided: 3300 mL  Total OUT: 3425 mL    Total NET: 3750 mL      09 Apr 2020 07:01  -  10 Apr 2020 02:29  --------------------------------------------------------  IN:    IV PiggyBack: 50 mL    multiple electrolytes Injection Type 1: 1025 mL    Oral Fluid: 960 mL  Total IN: 2035 mL    OUT:    Voided: 2450 mL  Total OUT: 2450 mL    Total NET: -415 mL          LABS:                        8.9    14.09 )-----------( 130      ( 09 Apr 2020 20:08 )             26.0     04-09    138  |  104  |  17.0  ----------------------------<  125<H>  4.8   |  20.0<L>  |  1.22    Ca    8.6      09 Apr 2020 06:05  Phos  2.6     04-09  Mg     2.5     04-09

## 2020-04-10 NOTE — CDI QUERY NOTE - NSCDIOTHERTXTBX_GEN_ALL_CORE_HH
Clinical documentation indicates that this patient has an elevated creatinine without an associated diagnosis.   In order to accurately capture this lab finding to the greatest degree of specificity reflecting the patient’s actual severity of illness please document the diagnosis, if known, associated with the below creatinine values & clinical evidence:       The [employer] Kid[employer]'s (KDIGO) definition of DIANNA includes an increase in SCr of greater than or equal to 0.3 mg/dl within 48 hours, or greater than or equal to 1.5 times the baseline, or urine volume less than 0.5 ml/kg/hr for 6 hours, which the [facility] policy for reporting DIANNA is based on.  Please refer to [facility] Policy #C1 "The Reporting of DIANNA in Adult/Pediatric Patients" for full detail.      Supporting Documentation and/or Clinical Evidence:      Creatinine Trend:  Creatinine, Serum: 1.53 mg/dL <H> [0.50 - 1.30] (04-10-20)  Creatinine, Serum: 1.22 mg/dL [0.50 - 1.30] (04-09-20)  Creatinine, Serum: 1.59 mg/dL <H> [0.50 - 1.30] (04-08-20)  Creatinine, Serum: 1.41 mg/dL <H> [0.50 - 1.30] (04-08-20)  Creatinine, Serum: 1.49 mg/dL <H> [0.50 - 1.30] (04-07-20)
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
## 2020-04-10 NOTE — CDI QUERY NOTE - NSCDIOTHERTXTBX2_GEN_ALL_CORE_FT
Patient received Magnesium Sulfate 2gram x2 doses on 4/8/20 with a mag level of 1.5. Can you please clarify if this supports clinical diagnosis?    A.	Hypomagnesemia  B.	Other, please specify  C.	Not clinically significant    Supporting Documentations:    MAGNESIUM, Serum  4/8/20: 1.5L    Medications:  Magnesium Sulfate 2 Grams IVPB x1 dose. Ordered twice. Given 4/8/20

## 2020-04-10 NOTE — OCCUPATIONAL THERAPY INITIAL EVALUATION ADULT - GENERAL OBSERVATIONS, REHAB EVAL
NAD, +Left VCB, +Telemetry, +, +2 wound vacs, +Right adela brace, +Right external fixator, +Right UE splinted/sling NAD, +Left VCB, +Telemetry, +, +2 wound vacs, +IV lock, +Right adela brace, +Right external fixator, +Right UE splinted/sling

## 2020-04-10 NOTE — OCCUPATIONAL THERAPY INITIAL EVALUATION ADULT - LIVES WITH, PROFILE
Patient lives in a private house with mom and brother who are available to assist 24/7. 4 GURINDER with bilateral handrails but they are to far apart to use at same time. Full flight of stairs inside the home with hand rail up to bedroom/bathroom./parents/other relative

## 2020-04-10 NOTE — OCCUPATIONAL THERAPY INITIAL EVALUATION ADULT - MODIFIED CLINICAL TEST OF SENSORY INTEGRATION IN BALANCE TEST
Static sitting balance with max A x1 to maintain upright trunk posture; patient able to use left UE to help prop self up however very weak, unable to maintain supporting self for more than a few seconds

## 2020-04-10 NOTE — OCCUPATIONAL THERAPY INITIAL EVALUATION ADULT - MANUAL MUSCLE TESTING RESULTS, REHAB EVAL
Left UE assessed during functional mobility 4-/5. Left gross grasp WFL 5/5. Right UE not assessed secondary to non weightbearing restrictions. Left UE assessed with AROM during functional mobility 3+/5. Left gross grasp WFL 5/5. Right UE not assessed secondary to non weightbearing restrictions.

## 2020-04-10 NOTE — PROGRESS NOTE ADULT - SUBJECTIVE AND OBJECTIVE BOX
Patient seen and examined at bedside. comfortable in bed, reports improvement of pain overall. Denies fever/chills, SOB/chest pain, abdominal pain, numbness/tingling. no complaints. Patient currently c/o tightness of adela, causing subjective tingling in toes    Vital Signs Last 24 Hrs  T(C): 37.5 (10 Apr 2020 06:14), Max: 37.9 (09 Apr 2020 23:50)  T(F): 99.5 (10 Apr 2020 06:14), Max: 100.2 (09 Apr 2020 23:50)  HR: 105 (10 Apr 2020 06:14) (96 - 114)  BP: 137/79 (10 Apr 2020 06:14) (121/66 - 147/84)  BP(mean): --  RR: 18 (09 Apr 2020 23:50) (18 - 24)  SpO2: 100% (10 Apr 2020 06:14) (99% - 100%)    RLE: Ex fix noted, in place. + mild dry bloody staining to distal medial pin site, all other pin sites C/D/I. No erythema, no active drainage. 2 prevenas noted, functioning. compartments soft, compressible throughout. + EHL/FHL (EHL weakened secondary to pain/swelling. Ext warm, cap refill brisk. SILT  RUE: Splint and sling C/D/I. + ROM phalanges. ext warm cap refill brisk. SILT                          8.9    14.09 )-----------( 130      ( 09 Apr 2020 20:08 )             26.0     A/P: 44 y.o M s/p CR right wrist/R knee I&D, patella tendon repair/right talus CR and ExFix, POD#3  - D/W Dr. Sebastian - will remove prevenas on Sunday for wound check  - distal medial pin site dressing changed  - NWB RUE/RLE  - adela loosened, patient reported immediate improvement of symptoms  - DVTP  - f/u outpatient with Dr. Haas for wrist management  - [facility] care primary team Patient seen and examined at bedside. comfortable in bed, reports improvement of pain overall. Denies fever/chills, SOB/chest pain, abdominal pain, numbness/tingling. no complaints. Patient currently c/o tightness of adela, causing subjective tingling in toes (states tingling began after bledsode was placed)    Vital Signs Last 24 Hrs  T(C): 37.5 (10 Apr 2020 06:14), Max: 37.9 (09 Apr 2020 23:50)  T(F): 99.5 (10 Apr 2020 06:14), Max: 100.2 (09 Apr 2020 23:50)  HR: 105 (10 Apr 2020 06:14) (96 - 114)  BP: 137/79 (10 Apr 2020 06:14) (121/66 - 147/84)  BP(mean): --  RR: 18 (09 Apr 2020 23:50) (18 - 24)  SpO2: 100% (10 Apr 2020 06:14) (99% - 100%)    RLE: Ex fix noted, in place. + mild dry bloody staining to distal medial pin site, all other pin sites C/D/I. No erythema, no active drainage. 2 prevenas noted, functioning. compartments soft, compressible throughout. + EHL/FHL (EHL weakened secondary to pain/swelling. Ext warm, cap refill brisk. SILT  RUE: Splint and sling C/D/I. + ROM phalanges. ext warm cap refill brisk. SILT                          8.9    14.09 )-----------( 130      ( 09 Apr 2020 20:08 )             26.0     A/P: 44 y.o M s/p CR right wrist/R knee I&D, patella tendon repair/right talus CR and ExFix, POD#3  - D/W Dr. Sebastian - will remove prevenas on Sunday for wound check  - distal medial pin site dressing changed  - NWB RUE/RLE  - adela loosened, patient reported immediate improvement of symptoms  - DVTP  - f/u outpatient with Dr. Haas for wrist management  - [facility] care primary team Patient seen and examined at bedside. comfortable in bed, reports improvement of pain overall. Denies fever/chills, SOB/chest pain, abdominal pain, numbness/tingling. no complaints. Patient currently c/o tightness of adela, causing subjective tingling in toes (states tingling began after bledsode was placed)    Vital Signs Last 24 Hrs  T(C): 37.5 (10 Apr 2020 06:14), Max: 37.9 (09 Apr 2020 23:50)  T(F): 99.5 (10 Apr 2020 06:14), Max: 100.2 (09 Apr 2020 23:50)  HR: 105 (10 Apr 2020 06:14) (96 - 114)  BP: 137/79 (10 Apr 2020 06:14) (121/66 - 147/84)  BP(mean): --  RR: 18 (09 Apr 2020 23:50) (18 - 24)  SpO2: 100% (10 Apr 2020 06:14) (99% - 100%)    RLE: Ex fix noted, in place. + mild dry bloody staining to distal medial pin site, all other pin sites C/D/I. No erythema, no active drainage. 2 prevenas noted, functioning. No erythema around prevena dressings. compartments soft, compressible throughout. + EHL/FHL (EHL weakened secondary to pain/swelling. Ext warm, cap refill brisk. SILT  RUE: Splint and sling C/D/I. + ROM phalanges. ext warm cap refill brisk. SILT                          8.9    14.09 )-----------( 130      ( 09 Apr 2020 20:08 )             26.0     A/P: 44 y.o M s/p CR right wrist/R knee I&D, patella tendon repair/right talus CR and ExFix, POD#3  - D/W Dr. Sebastian - will remove prevenas on Sunday for wound check  - distal medial pin site dressing changed  - NWB RUE/RLE  - adela loosened, patient reported immediate improvement of symptoms  - DVTP  - f/u outpatient with Dr. Haas for wrist management  - [facility] care primary team

## 2020-04-10 NOTE — OCCUPATIONAL THERAPY INITIAL EVALUATION ADULT - LEVEL OF INDEPENDENCE: SIT/STAND, REHAB EVAL
unable to perform attempted 2-3 trials; however patient unable to actively participate. Unsafe at this time./unable to perform

## 2020-04-10 NOTE — OCCUPATIONAL THERAPY INITIAL EVALUATION ADULT - ADDITIONAL COMMENTS
Patient has a tub with doors and no grab bars  Patient does not own any DME  Patient is right handed and drives

## 2020-04-10 NOTE — CDI QUERY NOTE - NSCDIOTHERTXTBX3_GEN_ALL_CORE_FT
Patient was given Sodium phos IVPD today for phos level of 2.0, can you please clarify if this supports a clinical diagnosis?  A.	Hypophosphatemia  B.	Other, please specify  C.	Not clinically significant      Supporting documentations:    Phosphorus, Serum  4/10/20: 2.0L      Medication:     Sodium Phosphate IVPD 15 mmol x1 dose. Given 4/10/20

## 2020-04-10 NOTE — CDI QUERY NOTE - NSCDIOTHERTXTBX4_GEN_ALL_CORE_FT
Daily CBC monitoring/check is done. Can you please clarify if decrease in H/H level supports a clinical diagnosis?    A.	Acute blood loss anemia  B.	Postprocedural blood loss anemia  C.	Other, please specify  D.	Not clinically significant    Supporting Documentations:    Hemoglobin/ Hematocrit   4/7/20: 13.4/40.3  4/8/20: 11.5L/ 34.9L  4/9/20: 8.7L/ 26.7L  4/9/20: 8.9L/ 26.0L  4/10/20: 7.8L/ 23.9L

## 2020-04-10 NOTE — OCCUPATIONAL THERAPY INITIAL EVALUATION ADULT - LEVEL OF INDEPENDENCE: BED TO CHAIR, REHAB EVAL
unable to perform unsafe at this time; recommending [place]/mechanical lift at this time/unable to perform

## 2020-04-10 NOTE — OCCUPATIONAL THERAPY INITIAL EVALUATION ADULT - LEVEL OF INDEPENDENCE: DRESS LOWER BODY, OT EVAL
dependent (less than 25% patients effort)/to don/doff socks dependent (less than 25% patients effort)/to don/doff left sock

## 2020-04-10 NOTE — OCCUPATIONAL THERAPY INITIAL EVALUATION ADULT - DIAGNOSIS, OT EVAL
s/p Right wrist closed reduction; Right knee I&D w/patella tendon repair; Right ankle open reduction of talus w/application of External fixator on Right LE

## 2020-04-10 NOTE — OCCUPATIONAL THERAPY INITIAL EVALUATION ADULT - SENSORY TESTS
Patient with capillary refill in Right UE/Right LE, patient does not offer any complaints or changes in sensation Patient with capillary refill in Right hand digits /Right toes, patient does not offer any complaints or changes in sensation

## 2020-04-10 NOTE — CHART NOTE - NSCHARTNOTEFT_GEN_A_CORE
Please note upon review of chart the following diagnoses exist:     1. Hypophoshatemia treated with sodium phosphate  2. Hypomagnesemia treated with magnesium sulfate  3. Acute Kidney Injury as evidenced by Increase in cr > 0.3 (KDIGO criteria)  4. Please note upon review of chart the following diagnoses exist:     1. Hypophoshatemia treated with sodium phosphate  2. Hypomagnesemia treated with magnesium sulfate  3. Acute Kidney Injury as evidenced by Increase in cr > 0.3 (KDIGO criteria)  4. Acute blood loss anemia due to trauma

## 2020-04-10 NOTE — PROGRESS NOTE ADULT - I HAVE PERSONALLY SEEN, EXAMINED, AND PARTICIPATED IN THE CARE OF THIS PATIENT.  I HAVE REVIEWED ALL PERTINENT CLINICAL INFORMATION, INCLUDING HISTORY, PHYSICAL EXAM, PLAN AND THE MEDICAL/PA/NP STUDENT’S NOTE AND AGREE EXCEPT AS NOTED.
I reviewed the H&P, I examined the patient, and there are no changes in the patient's condition.    HD WMF at [facility], last session yesterday via LIJ catheter.  No anticoagulants other than aspirin, BMP drawn and pending.  Left arm marked, good vein, strong brachial and radial pulse.    F/u pre-op K+  OR for left AC fossa BC AVF    Roberto Foss MD  [facility]  General & Vascular Surgery  Office: [phone]  Pager: [phone]     Statement Selected

## 2020-04-11 LAB
ANION GAP SERPL CALC-SCNC: 11 MMOL/L — SIGNIFICANT CHANGE UP (ref 5–17)
APPEARANCE UR: CLEAR — SIGNIFICANT CHANGE UP
BACTERIA # UR AUTO: NEGATIVE — SIGNIFICANT CHANGE UP
BILIRUB UR-MCNC: NEGATIVE — SIGNIFICANT CHANGE UP
BLD GP AB SCN SERPL QL: SIGNIFICANT CHANGE UP
BUN SERPL-MCNC: 17 MG/DL — SIGNIFICANT CHANGE UP (ref 8–20)
CALCIUM SERPL-MCNC: 8.7 MG/DL — SIGNIFICANT CHANGE UP (ref 8.6–10.2)
CHLORIDE SERPL-SCNC: 100 MMOL/L — SIGNIFICANT CHANGE UP (ref 98–107)
CK MB CFR SERPL CALC: 1.7 NG/ML — SIGNIFICANT CHANGE UP (ref 0–6.7)
CK SERPL-CCNC: 2261 U/L — HIGH (ref 30–200)
CO2 SERPL-SCNC: 23 MMOL/L — SIGNIFICANT CHANGE UP (ref 22–29)
COLOR SPEC: YELLOW — SIGNIFICANT CHANGE UP
CREAT SERPL-MCNC: 1.28 MG/DL — SIGNIFICANT CHANGE UP (ref 0.5–1.3)
DIFF PNL FLD: ABNORMAL
EPI CELLS # UR: NEGATIVE — SIGNIFICANT CHANGE UP
GLUCOSE SERPL-MCNC: 119 MG/DL — HIGH (ref 70–99)
GLUCOSE UR QL: NEGATIVE MG/DL — SIGNIFICANT CHANGE UP
HCT VFR BLD CALC: 24.9 % — LOW (ref 39–50)
HGB BLD-MCNC: 8.4 G/DL — LOW (ref 13–17)
KETONES UR-MCNC: NEGATIVE — SIGNIFICANT CHANGE UP
LEUKOCYTE ESTERASE UR-ACNC: NEGATIVE — SIGNIFICANT CHANGE UP
MCHC RBC-ENTMCNC: 29.7 PG — SIGNIFICANT CHANGE UP (ref 27–34)
MCHC RBC-ENTMCNC: 33.7 GM/DL — SIGNIFICANT CHANGE UP (ref 32–36)
MCV RBC AUTO: 88 FL — SIGNIFICANT CHANGE UP (ref 80–100)
NITRITE UR-MCNC: NEGATIVE — SIGNIFICANT CHANGE UP
PH UR: 8 — SIGNIFICANT CHANGE UP (ref 5–8)
PLATELET # BLD AUTO: 192 K/UL — SIGNIFICANT CHANGE UP (ref 150–400)
POTASSIUM SERPL-MCNC: 4.5 MMOL/L — SIGNIFICANT CHANGE UP (ref 3.5–5.3)
POTASSIUM SERPL-SCNC: 4.5 MMOL/L — SIGNIFICANT CHANGE UP (ref 3.5–5.3)
PROT UR-MCNC: 30 MG/DL
RBC # BLD: 2.83 M/UL — LOW (ref 4.2–5.8)
RBC # FLD: 14.2 % — SIGNIFICANT CHANGE UP (ref 10.3–14.5)
RBC CASTS # UR COMP ASSIST: SIGNIFICANT CHANGE UP /HPF (ref 0–4)
SODIUM SERPL-SCNC: 134 MMOL/L — LOW (ref 135–145)
SP GR SPEC: 1.01 — SIGNIFICANT CHANGE UP (ref 1.01–1.02)
UROBILINOGEN FLD QL: 4 MG/DL
WBC # BLD: 11.51 K/UL — HIGH (ref 3.8–10.5)
WBC # FLD AUTO: 11.51 K/UL — HIGH (ref 3.8–10.5)
WBC UR QL: SIGNIFICANT CHANGE UP

## 2020-04-11 PROCEDURE — 71045 X-RAY EXAM CHEST 1 VIEW: CPT | Mod: 26

## 2020-04-11 RX ORDER — CHLORHEXIDINE GLUCONATE 213 G/1000ML
1 SOLUTION TOPICAL
Refills: 0 | Status: DISCONTINUED | OUTPATIENT
Start: 2020-04-11 | End: 2020-04-13

## 2020-04-11 RX ORDER — SODIUM CHLORIDE 9 MG/ML
10 INJECTION INTRAMUSCULAR; INTRAVENOUS; SUBCUTANEOUS
Refills: 0 | Status: DISCONTINUED | OUTPATIENT
Start: 2020-04-11 | End: 2020-04-16

## 2020-04-11 RX ORDER — PANTOPRAZOLE SODIUM 20 MG/1
40 TABLET, DELAYED RELEASE ORAL
Refills: 0 | Status: DISCONTINUED | OUTPATIENT
Start: 2020-04-11 | End: 2020-04-16

## 2020-04-11 RX ADMIN — SENNA PLUS 2 TABLET(S): 8.6 TABLET ORAL at 17:48

## 2020-04-11 RX ADMIN — Medication 100 MILLIGRAM(S): at 15:21

## 2020-04-11 RX ADMIN — ENOXAPARIN SODIUM 30 MILLIGRAM(S): 100 INJECTION SUBCUTANEOUS at 05:57

## 2020-04-11 RX ADMIN — OXYCODONE HYDROCHLORIDE 10 MILLIGRAM(S): 5 TABLET ORAL at 20:41

## 2020-04-11 RX ADMIN — POLYETHYLENE GLYCOL 3350 17 GRAM(S): 17 POWDER, FOR SOLUTION ORAL at 12:33

## 2020-04-11 RX ADMIN — ENOXAPARIN SODIUM 30 MILLIGRAM(S): 100 INJECTION SUBCUTANEOUS at 17:43

## 2020-04-11 RX ADMIN — OXYCODONE HYDROCHLORIDE 10 MILLIGRAM(S): 5 TABLET ORAL at 12:29

## 2020-04-11 RX ADMIN — Medication 100 MILLIGRAM(S): at 21:37

## 2020-04-11 RX ADMIN — Medication 650 MILLIGRAM(S): at 03:40

## 2020-04-11 RX ADMIN — Medication 650 MILLIGRAM(S): at 20:40

## 2020-04-11 RX ADMIN — SODIUM CHLORIDE 125 MILLILITER(S): 9 INJECTION, SOLUTION INTRAVENOUS at 02:33

## 2020-04-11 RX ADMIN — OXYCODONE HYDROCHLORIDE 10 MILLIGRAM(S): 5 TABLET ORAL at 02:11

## 2020-04-11 RX ADMIN — Medication 100 MILLIGRAM(S): at 07:29

## 2020-04-11 RX ADMIN — TRAMADOL HYDROCHLORIDE 50 MILLIGRAM(S): 50 TABLET ORAL at 14:07

## 2020-04-11 NOTE — PROCEDURE NOTE - ESTIMATED BLOOD LOSS
Encounter addended by: Daniela Lazo, SLP on: 4/25/2018 11:07 AM<BR>     Actions taken: Sign clinical note, Episode resolved
Minimal

## 2020-04-11 NOTE — PROGRESS NOTE ADULT - SUBJECTIVE AND OBJECTIVE BOX
Pt Name: JANES ALDRICH    MRN: [id number]      Patient is 44 year old male status post closed closed right wrist/R knee I&D, patella tendon repair/right talus CR and ExFix, POD#4. Patient seen and examined at bedside this morning. Patient receiving 1U PRBCs and fluid as per primary team. Patient comfortable in bed, pain appropriately controlled with prescribed pain medications. Patient seen/evaluated by PT/OT yesterday will continue to progress today- NWB of right upper and lower extremities.  Denies fever/chills, SOB/chest pain, abdominal pain, numbness/tingling. No new complaints.    PAST MEDICAL & SURGICAL HISTORY:  PAST MEDICAL & SURGICAL HISTORY:  No pertinent past medical history  Epididymitis  Spinal stenosis of thoracolumbar region  Hypertrophic scar of skin  Coccidioidomycosis: spine  Previous back surgery: 2/2 coccidios mycosis  History of thoracic surgery: and lumbar (CAGE)      Allergies: No Known Allergies      Medications: acetaminophen   Tablet .. 650 milliGRAM(s) Oral every 6 hours PRN  bisacodyl Suppository 10 milliGRAM(s) Rectal daily PRN  ceFAZolin   IVPB      ceFAZolin   IVPB 2000 milliGRAM(s) IV Intermittent every 8 hours  enoxaparin Injectable 30 milliGRAM(s) SubCutaneous every 12 hours  multiple electrolytes Injection Type 1 1000 milliLiter(s) IV Continuous <Continuous>  oxyCODONE    IR 5 milliGRAM(s) Oral every 6 hours PRN  oxyCODONE    IR 10 milliGRAM(s) Oral every 6 hours PRN  polyethylene glycol 3350 17 Gram(s) Oral daily  senna 2 Tablet(s) Oral at bedtime  traMADol 50 milliGRAM(s) Oral every 6 hours PRN  traMADol 25 milliGRAM(s) Oral every 6 hours PRN                              7.3    12.19 )-----------( 151      ( 10 Apr 2020 18:38 )             22.0     04-10    135  |  100  |  15.0  ----------------------------<  116<H>  4.6   |  25.0  |  1.34<H>    Ca    8.5<L>      10 Apr 2020 17:31  Phos  2.0     04-10  Mg     2.2     04-10        PHYSICAL EXAM:    Vital Signs Last 24 Hrs  T(C): 37.9 (11 Apr 2020 04:58), Max: 38.7 (11 Apr 2020 03:38)  T(F): 100.2 (11 Apr 2020 04:58), Max: 101.7 (11 Apr 2020 03:38)  HR: 101 (11 Apr 2020 05:54) (101 - 110)  BP: 132/62 (11 Apr 2020 04:58) (132/62 - 144/78)  BP(mean): --  RR: 18 (11 Apr 2020 05:54) (16 - 99)  SpO2: 99% (11 Apr 2020 05:54) (99% - 100%)  Daily     Daily     Appearance: Alert, responsive, in no acute distress.    RLE: adela brace in appropriate position. Ex fix noted, in place. all pin sites C/D/I. No erythema, no active drainage. 2 prevenas noted, functioning under good seal. No erythema around prevena dressings. compartments soft, compressible throughout. + EHL/FHL  Ext warm, cap refill brisk. Sensation to light touch grossly intact to light touch without deficit.   RUE: Splint and sling C/D/I. + ROM phalanges. ext warm cap refill brisk. SILT    A/P: 44 y.o M s/p CR right wrist/R knee I&D, patella tendon repair/right talus CR and ExFix, POD#4    - prevena dressings to be removed tomorrow for wound check   - NWB RUE/RLE  - Pain control as clinically indicated  - DVTP as prescribed  - [facility] care primary team

## 2020-04-11 NOTE — PROCEDURE NOTE - NSPOSTPRCRAD_GEN_A_CORE
central line located in the/line adjusted to depth of insertion/post-procedure radiography performed

## 2020-04-11 NOTE — PROGRESS NOTE ADULT - SUBJECTIVE AND OBJECTIVE BOX
INTERVAL HPI/OVERNIGHT EVENTS:    patient started on 1 unit of blood transfusion to hb of 7.3 with tachycardia. Patient otherwise feeling better today with no acute changes or complaints. Patients' pain is well controlled on current pain regiment. Tolerating diet without any n/v, has been having normal bowel function. Remains hemodynamically stable and denies fevers, chills. No CP or SOB     MEDICATIONS  (STANDING):  ceFAZolin   IVPB      ceFAZolin   IVPB 2000 milliGRAM(s) IV Intermittent every 8 hours  enoxaparin Injectable 30 milliGRAM(s) SubCutaneous every 12 hours  multiple electrolytes Injection Type 1 1000 milliLiter(s) (125 mL/Hr) IV Continuous <Continuous>  polyethylene glycol 3350 17 Gram(s) Oral daily  senna 2 Tablet(s) Oral at bedtime    MEDICATIONS  (PRN):  acetaminophen   Tablet .. 650 milliGRAM(s) Oral every 6 hours PRN Temp greater or equal to 38C (100.4F), Mild Pain (1 - 3), Moderate Pain (4 - 6)  bisacodyl Suppository 10 milliGRAM(s) Rectal daily PRN Constipation  oxyCODONE    IR 5 milliGRAM(s) Oral every 6 hours PRN Severe Pain (7 - 10)  oxyCODONE    IR 10 milliGRAM(s) Oral every 6 hours PRN Breakthrough Pain  traMADol 50 milliGRAM(s) Oral every 6 hours PRN Moderate Pain (4 - 6)  traMADol 25 milliGRAM(s) Oral every 6 hours PRN Mild Pain (1 - 3)      Vital Signs Last 24 Hrs  T(C): 37.1 (10 Apr 2020 20:00), Max: 38 (10 Apr 2020 08:44)  T(F): 98.8 (10 Apr 2020 20:00), Max: 100.4 (10 Apr 2020 08:44)  HR: 108 (10 Apr 2020 20:00) (102 - 108)  BP: 142/77 (10 Apr 2020 20:00) (137/79 - 142/77)  BP(mean): --  RR: 16 (10 Apr 2020 20:00) (16 - 99)  SpO2: 100% (10 Apr 2020 20:00) (100% - 100%)    Physical Exam:    Neurological:  No sensory/motor deficits    HEENT: PERRLA, EOMI, no drainage or redness    Neck: No bruits; no thyromegaly or nodules,  No JVD    Back: Normal spine flexure, No CVA tenderness, No deformity or limitation of movement    Respiratory: Breath Sounds equal & clear to auscultation, no accessory muscle use    Cardiovascular: Regular rate & rhythm, normal S1, S2; no murmurs, gallops or rubs    Gastrointestinal: Soft, non-tender, normal bowel sounds    Extremities: No peripheral edema, No cyanosis, clubbing     Vascular: Equal and normal pulses: 2+ peripheral pulses throughout    Musculoskeletal: No joint pain, swelling or deformity; no limitation of movement    Skin: No rashes      I&O's Detail    09 Apr 2020 07:01  -  10 Apr 2020 07:00  --------------------------------------------------------  IN:    IV PiggyBack: 50 mL    multiple electrolytes Injection Type 1multiple electrolytes Injection Type 1: 1025 mL    Oral Fluid: 960 mL  Total IN: 2035 mL    OUT:    Voided: 3075 mL  Total OUT: 3075 mL    Total NET: -1040 mL      10 Apr 2020 07:01  -  11 Apr 2020 02:02  --------------------------------------------------------  IN:    IV PiggyBack: 300 mL    multiple electrolytes Injection Type 1: 1000 mL    multiple electrolytes Injection Type 1 Bolus: 1875 mL    multiple electrolytes Injection Type 1multiple electrolytes Injection Type 1: 1000 mL    Oral Fluid: 200 mL  Total IN: 4375 mL    OUT:    Voided: 2100 mL  Total OUT: 2100 mL    Total NET: 2275 mL          LABS:                        7.3    12.19 )-----------( 151      ( 10 Apr 2020 18:38 )             22.0     04-10    135  |  100  |  15.0  ----------------------------<  116<H>  4.6   |  25.0  |  1.34<H>    Ca    8.5<L>      10 Apr 2020 17:31  Phos  2.0     04-10  Mg     2.2     04-10            RADIOLOGY & ADDITIONAL STUDIES: INTERVAL HPI/OVERNIGHT EVENTS:    patient started on 1 unit of blood transfusion to hb of 7.3 with tachycardia. Patient otherwise feeling better today with no acute changes or complaints. Patients' pain is well controlled on current pain regiment. Tolerating diet without any n/v, has been having normal bowel function. Remains hemodynamically stable and denies fevers, chills. No CP or SOB     MEDICATIONS  (STANDING):  ceFAZolin   IVPB      ceFAZolin   IVPB 2000 milliGRAM(s) IV Intermittent every 8 hours  enoxaparin Injectable 30 milliGRAM(s) SubCutaneous every 12 hours  multiple electrolytes Injection Type 1 1000 milliLiter(s) (125 mL/Hr) IV Continuous <Continuous>  polyethylene glycol 3350 17 Gram(s) Oral daily  senna 2 Tablet(s) Oral at bedtime    MEDICATIONS  (PRN):  acetaminophen   Tablet .. 650 milliGRAM(s) Oral every 6 hours PRN Temp greater or equal to 38C (100.4F), Mild Pain (1 - 3), Moderate Pain (4 - 6)  bisacodyl Suppository 10 milliGRAM(s) Rectal daily PRN Constipation  oxyCODONE    IR 5 milliGRAM(s) Oral every 6 hours PRN Severe Pain (7 - 10)  oxyCODONE    IR 10 milliGRAM(s) Oral every 6 hours PRN Breakthrough Pain  traMADol 50 milliGRAM(s) Oral every 6 hours PRN Moderate Pain (4 - 6)  traMADol 25 milliGRAM(s) Oral every 6 hours PRN Mild Pain (1 - 3)      Vital Signs Last 24 Hrs  T(C): 37.1 (10 Apr 2020 20:00), Max: 38 (10 Apr 2020 08:44)  T(F): 98.8 (10 Apr 2020 20:00), Max: 100.4 (10 Apr 2020 08:44)  HR: 108 (10 Apr 2020 20:00) (102 - 108)  BP: 142/77 (10 Apr 2020 20:00) (137/79 - 142/77)  BP(mean): --  RR: 16 (10 Apr 2020 20:00) (16 - 99)  SpO2: 100% (10 Apr 2020 20:00) (100% - 100%)    Physical Exam:  GEN: NAD, laying in bed, appears stated age  HEENT: NCAT, clear oral mucosa, normal conjunctiva  Chest: non-tender  CV:  non-tachycardic, 2+ radial pulse  Pulm: no increased work of breathing, no conversational dyspnea  GI: soft, __ tender, dressing in place c/d/i, non-distended  MSK: RLE in brace brought in brought orthodists. Prevena vac in place with good seal. Ex-fix in place with dressings being managed by ortho. RUE splinted and in a sling.  vasc: 2+ DP b/l      I&O's Detail    09 Apr 2020 07:01  -  10 Apr 2020 07:00  --------------------------------------------------------  IN:    IV PiggyBack: 50 mL    multiple electrolytes Injection Type 1multiple electrolytes Injection Type 1: 1025 mL    Oral Fluid: 960 mL  Total IN: 2035 mL    OUT:    Voided: 3075 mL  Total OUT: 3075 mL    Total NET: -1040 mL      10 Apr 2020 07:01  -  11 Apr 2020 02:02  --------------------------------------------------------  IN:    IV PiggyBack: 300 mL    multiple electrolytes Injection Type 1: 1000 mL    multiple electrolytes Injection Type 1 Bolus: 1875 mL    multiple electrolytes Injection Type 1multiple electrolytes Injection Type 1: 1000 mL    Oral Fluid: 200 mL  Total IN: 4375 mL    OUT:    Voided: 2100 mL  Total OUT: 2100 mL    Total NET: 2275 mL          LABS:                        7.3    12.19 )-----------( 151      ( 10 Apr 2020 18:38 )             22.0     04-10    135  |  100  |  15.0  ----------------------------<  116<H>  4.6   |  25.0  |  1.34<H>    Ca    8.5<L>      10 Apr 2020 17:31  Phos  2.0     04-10  Mg     2.2     04-10            RADIOLOGY & ADDITIONAL STUDIES:

## 2020-04-12 LAB
ANION GAP SERPL CALC-SCNC: 12 MMOL/L — SIGNIFICANT CHANGE UP (ref 5–17)
BASOPHILS # BLD AUTO: 0.03 K/UL — SIGNIFICANT CHANGE UP (ref 0–0.2)
BASOPHILS NFR BLD AUTO: 0.3 % — SIGNIFICANT CHANGE UP (ref 0–2)
BUN SERPL-MCNC: 19 MG/DL — SIGNIFICANT CHANGE UP (ref 8–20)
CALCIUM SERPL-MCNC: 8.7 MG/DL — SIGNIFICANT CHANGE UP (ref 8.6–10.2)
CHLORIDE SERPL-SCNC: 98 MMOL/L — SIGNIFICANT CHANGE UP (ref 98–107)
CK MB CFR SERPL CALC: 1.5 NG/ML — SIGNIFICANT CHANGE UP (ref 0–6.7)
CK SERPL-CCNC: 1377 U/L — HIGH (ref 30–200)
CO2 SERPL-SCNC: 23 MMOL/L — SIGNIFICANT CHANGE UP (ref 22–29)
CREAT SERPL-MCNC: 1.22 MG/DL — SIGNIFICANT CHANGE UP (ref 0.5–1.3)
EOSINOPHIL # BLD AUTO: 0.36 K/UL — SIGNIFICANT CHANGE UP (ref 0–0.5)
EOSINOPHIL NFR BLD AUTO: 3.2 % — SIGNIFICANT CHANGE UP (ref 0–6)
GLUCOSE SERPL-MCNC: 118 MG/DL — HIGH (ref 70–99)
HCT VFR BLD CALC: 25.4 % — LOW (ref 39–50)
HGB BLD-MCNC: 8.4 G/DL — LOW (ref 13–17)
IMM GRANULOCYTES NFR BLD AUTO: 1.1 % — SIGNIFICANT CHANGE UP (ref 0–1.5)
LYMPHOCYTES # BLD AUTO: 1.1 K/UL — SIGNIFICANT CHANGE UP (ref 1–3.3)
LYMPHOCYTES # BLD AUTO: 9.9 % — LOW (ref 13–44)
MAGNESIUM SERPL-MCNC: 2.1 MG/DL — SIGNIFICANT CHANGE UP (ref 1.8–2.6)
MCHC RBC-ENTMCNC: 29.5 PG — SIGNIFICANT CHANGE UP (ref 27–34)
MCHC RBC-ENTMCNC: 33.1 GM/DL — SIGNIFICANT CHANGE UP (ref 32–36)
MCV RBC AUTO: 89.1 FL — SIGNIFICANT CHANGE UP (ref 80–100)
MONOCYTES # BLD AUTO: 1.58 K/UL — HIGH (ref 0–0.9)
MONOCYTES NFR BLD AUTO: 14.2 % — HIGH (ref 2–14)
NEUTROPHILS # BLD AUTO: 7.96 K/UL — HIGH (ref 1.8–7.4)
NEUTROPHILS NFR BLD AUTO: 71.3 % — SIGNIFICANT CHANGE UP (ref 43–77)
PHOSPHATE SERPL-MCNC: 2.8 MG/DL — SIGNIFICANT CHANGE UP (ref 2.4–4.7)
PLATELET # BLD AUTO: 222 K/UL — SIGNIFICANT CHANGE UP (ref 150–400)
POTASSIUM SERPL-MCNC: 4.7 MMOL/L — SIGNIFICANT CHANGE UP (ref 3.5–5.3)
POTASSIUM SERPL-SCNC: 4.7 MMOL/L — SIGNIFICANT CHANGE UP (ref 3.5–5.3)
RBC # BLD: 2.85 M/UL — LOW (ref 4.2–5.8)
RBC # FLD: 14.5 % — SIGNIFICANT CHANGE UP (ref 10.3–14.5)
SODIUM SERPL-SCNC: 133 MMOL/L — LOW (ref 135–145)
WBC # BLD: 11.15 K/UL — HIGH (ref 3.8–10.5)
WBC # FLD AUTO: 11.15 K/UL — HIGH (ref 3.8–10.5)

## 2020-04-12 RX ORDER — OXYCODONE HYDROCHLORIDE 5 MG/1
10 TABLET ORAL
Refills: 0 | Status: DISCONTINUED | OUTPATIENT
Start: 2020-04-12 | End: 2020-04-16

## 2020-04-12 RX ORDER — HYDROMORPHONE HYDROCHLORIDE 2 MG/ML
1 INJECTION INTRAMUSCULAR; INTRAVENOUS; SUBCUTANEOUS ONCE
Refills: 0 | Status: DISCONTINUED | OUTPATIENT
Start: 2020-04-12 | End: 2020-04-12

## 2020-04-12 RX ORDER — HYDROMORPHONE HYDROCHLORIDE 2 MG/ML
4 INJECTION INTRAMUSCULAR; INTRAVENOUS; SUBCUTANEOUS
Refills: 0 | Status: DISCONTINUED | OUTPATIENT
Start: 2020-04-12 | End: 2020-04-16

## 2020-04-12 RX ORDER — OXYCODONE HYDROCHLORIDE 5 MG/1
5 TABLET ORAL
Refills: 0 | Status: DISCONTINUED | OUTPATIENT
Start: 2020-04-12 | End: 2020-04-16

## 2020-04-12 RX ORDER — HYDROMORPHONE HYDROCHLORIDE 2 MG/ML
0.5 INJECTION INTRAMUSCULAR; INTRAVENOUS; SUBCUTANEOUS
Refills: 0 | Status: DISCONTINUED | OUTPATIENT
Start: 2020-04-12 | End: 2020-04-16

## 2020-04-12 RX ORDER — ACETAMINOPHEN 500 MG
975 TABLET ORAL EVERY 8 HOURS
Refills: 0 | Status: DISCONTINUED | OUTPATIENT
Start: 2020-04-12 | End: 2020-04-13

## 2020-04-12 RX ORDER — SODIUM CHLORIDE 9 MG/ML
1000 INJECTION INTRAMUSCULAR; INTRAVENOUS; SUBCUTANEOUS
Refills: 0 | Status: DISCONTINUED | OUTPATIENT
Start: 2020-04-12 | End: 2020-04-13

## 2020-04-12 RX ADMIN — ENOXAPARIN SODIUM 30 MILLIGRAM(S): 100 INJECTION SUBCUTANEOUS at 05:25

## 2020-04-12 RX ADMIN — Medication 650 MILLIGRAM(S): at 02:53

## 2020-04-12 RX ADMIN — CHLORHEXIDINE GLUCONATE 1 APPLICATION(S): 213 SOLUTION TOPICAL at 05:58

## 2020-04-12 RX ADMIN — OXYCODONE HYDROCHLORIDE 10 MILLIGRAM(S): 5 TABLET ORAL at 05:25

## 2020-04-12 RX ADMIN — HYDROMORPHONE HYDROCHLORIDE 4 MILLIGRAM(S): 2 INJECTION INTRAMUSCULAR; INTRAVENOUS; SUBCUTANEOUS at 09:56

## 2020-04-12 RX ADMIN — POLYETHYLENE GLYCOL 3350 17 GRAM(S): 17 POWDER, FOR SOLUTION ORAL at 12:18

## 2020-04-12 RX ADMIN — HYDROMORPHONE HYDROCHLORIDE 4 MILLIGRAM(S): 2 INJECTION INTRAMUSCULAR; INTRAVENOUS; SUBCUTANEOUS at 13:12

## 2020-04-12 RX ADMIN — HYDROMORPHONE HYDROCHLORIDE 4 MILLIGRAM(S): 2 INJECTION INTRAMUSCULAR; INTRAVENOUS; SUBCUTANEOUS at 20:27

## 2020-04-12 RX ADMIN — Medication 100 MILLIGRAM(S): at 14:12

## 2020-04-12 RX ADMIN — Medication 10 MILLIGRAM(S): at 12:19

## 2020-04-12 RX ADMIN — ENOXAPARIN SODIUM 30 MILLIGRAM(S): 100 INJECTION SUBCUTANEOUS at 17:13

## 2020-04-12 RX ADMIN — Medication 975 MILLIGRAM(S): at 17:14

## 2020-04-12 RX ADMIN — PANTOPRAZOLE SODIUM 40 MILLIGRAM(S): 20 TABLET, DELAYED RELEASE ORAL at 05:28

## 2020-04-12 RX ADMIN — Medication 100 MILLIGRAM(S): at 05:26

## 2020-04-12 RX ADMIN — HYDROMORPHONE HYDROCHLORIDE 4 MILLIGRAM(S): 2 INJECTION INTRAMUSCULAR; INTRAVENOUS; SUBCUTANEOUS at 17:24

## 2020-04-12 RX ADMIN — HYDROMORPHONE HYDROCHLORIDE 1 MILLIGRAM(S): 2 INJECTION INTRAMUSCULAR; INTRAVENOUS; SUBCUTANEOUS at 07:19

## 2020-04-12 RX ADMIN — Medication 100 MILLIGRAM(S): at 22:47

## 2020-04-12 RX ADMIN — Medication 10 MILLIGRAM(S): at 05:28

## 2020-04-12 RX ADMIN — Medication 975 MILLIGRAM(S): at 22:47

## 2020-04-12 NOTE — PROGRESS NOTE ADULT - SUBJECTIVE AND OBJECTIVE BOX
HPI/OVERNIGHT EVENTS:  Overnight, patient febrile to 101 with persistent tachycardia, however doing well overall with no new complaints. Left upper extremity swelling improved. Reports some numbness in right foot. Denies CP, SOB, sweating.   MEDICATIONS  (STANDING):  ceFAZolin   IVPB      ceFAZolin   IVPB 2000 milliGRAM(s) IV Intermittent every 8 hours  chlorhexidine 4% Liquid 1 Application(s) Topical <User Schedule>  enoxaparin Injectable 30 milliGRAM(s) SubCutaneous every 12 hours  multiple electrolytes Injection Type 1 1000 milliLiter(s) (125 mL/Hr) IV Continuous <Continuous>  pantoprazole    Tablet 40 milliGRAM(s) Oral before breakfast  polyethylene glycol 3350 17 Gram(s) Oral daily  senna 2 Tablet(s) Oral at bedtime    MEDICATIONS  (PRN):  acetaminophen   Tablet .. 650 milliGRAM(s) Oral every 6 hours PRN Temp greater or equal to 38C (100.4F), Mild Pain (1 - 3), Moderate Pain (4 - 6)  bisacodyl Suppository 10 milliGRAM(s) Rectal daily PRN Constipation  oxyCODONE    IR 5 milliGRAM(s) Oral every 6 hours PRN Severe Pain (7 - 10)  oxyCODONE    IR 10 milliGRAM(s) Oral every 6 hours PRN Breakthrough Pain  sodium chloride 0.9% lock flush 10 milliLiter(s) IV Push every 1 hour PRN Pre/post blood products, medications, blood draw, and to maintain line patency  traMADol 50 milliGRAM(s) Oral every 6 hours PRN Moderate Pain (4 - 6)  traMADol 25 milliGRAM(s) Oral every 6 hours PRN Mild Pain (1 - 3)      Vital Signs Last 24 Hrs  T(C): 37.8 (2020 23:08), Max: 38.7 (2020 03:38)  T(F): 100 (2020 23:08), Max: 101.7 (2020 03:38)  HR: 106 (2020 23:08) (93 - 110)  BP: 133/68 (2020 23:08) (132/62 - 154/81)  BP(mean): --  RR: 18 (2020 20:24) (16 - 19)  SpO2: 99% (2020 20:24) (99% - 100%)    Physical Exam:  GEN: NAD  HEENT: NCAT, clear oral mucosa, normal conjunctiva  Chest: non-tender  CV:  non-tachycardic, 2+ radial pulse left  Pulm: no increased work of breathing, no conversational dyspnea  GI: soft, nontender, dressing in place c/d/i, non-distended  MSK: RLE in ex-fix and brace Prevena vac in place with good seal.  RUE splinted and in a sling.  vasc: 2+ DP b/l    I&O's Detail    10 Apr 2020 07:01  -  2020 07:00  --------------------------------------------------------  IN:    IV PiggyBack: 300 mL    multiple electrolytes Injection Type 1: 1000 mL    multiple electrolytes Injection Type 1 Bolus: 1875 mL    multiple electrolytes Injection Type 1multiple electrolytes Injection Type 1: 1000 mL    Oral Fluid: 200 mL  Total IN: 4375 mL    OUT:    Voided: 2500 mL  Total OUT: 2500 mL    Total NET: 1875 mL      2020 07:01  -  2020 01:02  --------------------------------------------------------  IN:  Total IN: 0 mL    OUT:    Voided: 1625 mL    Voided: 650 mL  Total OUT: 2275 mL    Total NET: -2275 mL          LABS:                        8.4    11.51 )-----------( 192      ( 2020 12:35 )             24.9     04-11    134<L>  |  100  |  17.0  ----------------------------<  119<H>  4.5   |  23.0  |  1.28    Ca    8.7      2020 12:35  Phos  2.0     04-10  Mg     2.2     04-10        Urinalysis Basic - ( 2020 06:54 )    Color: Yellow / Appearance: Clear / S.010 / pH: x  Gluc: x / Ketone: Negative  / Bili: Negative / Urobili: 4 mg/dL   Blood: x / Protein: 30 mg/dL / Nitrite: Negative   Leuk Esterase: Negative / RBC: 0-2 /HPF / WBC 0-2   Sq Epi: x / Non Sq Epi: Negative / Bacteria: Negative

## 2020-04-12 NOTE — PROGRESS NOTE ADULT - SUBJECTIVE AND OBJECTIVE BOX
Pt Name: JANES ALDRICH    MRN: [id number]    Patient is 44 year old male status post closed closed right wrist reduction, R knee I&D, patella tendon repair, right open talas dislocation and ExFix, POD#5. Patient seen and examined at bedside this morning. Patient comfortable in bed, pain appropriately controlled with prescribed pain medications. Pt reports mild parasthesias in toes, has been present since injury/surgery - not worsening. Nurse reports low fever overnight, Blood Cx taken, denies fever/chills, SOB/chest pain, abdominal pain, motorsensory changes.    PAST MEDICAL & SURGICAL HISTORY:  PAST MEDICAL & SURGICAL HISTORY:  No pertinent past medical history  Epididymitis  Spinal stenosis of thoracolumbar region  Hypertrophic scar of skin  Coccidioidomycosis: spine  Previous back surgery: 2/2 coccidios mycosis  History of thoracic surgery: and lumbar (CAGE)      Allergies: No Known Allergies    Medications: acetaminophen   Tablet .. 975 milliGRAM(s) Oral every 8 hours  bisacodyl Suppository 10 milliGRAM(s) Rectal daily  ceFAZolin   IVPB      ceFAZolin   IVPB 2000 milliGRAM(s) IV Intermittent every 8 hours  chlorhexidine 4% Liquid 1 Application(s) Topical <User Schedule>  enoxaparin Injectable 30 milliGRAM(s) SubCutaneous every 12 hours  HYDROmorphone   Tablet 4 milliGRAM(s) Oral every 3 hours PRN  HYDROmorphone  Injectable 0.5 milliGRAM(s) IV Push every 3 hours PRN  multiple electrolytes Injection Type 1 1000 milliLiter(s) IV Continuous <Continuous>  oxyCODONE    IR 5 milliGRAM(s) Oral every 3 hours PRN  oxyCODONE    IR 10 milliGRAM(s) Oral every 3 hours PRN  pantoprazole    Tablet 40 milliGRAM(s) Oral before breakfast  polyethylene glycol 3350 17 Gram(s) Oral daily  senna 2 Tablet(s) Oral at bedtime  sodium chloride 0.9% lock flush 10 milliLiter(s) IV Push every 1 hour PRN                 8.4    11.15 )-----------( 222      ( 12 Apr 2020 09:48 )             25.4     04-12    133<L>  |  98  |  19.0  ----------------------------<  118<H>  4.7   |  23.0  |  1.22    Ca    8.7      12 Apr 2020 09:48  Phos  2.8     04-12  Mg     2.1     04-12        PHYSICAL EXAM:    Vital Signs Last 24 Hrs  T(C): 37.6 (12 Apr 2020 07:23), Max: 38.4 (11 Apr 2020 20:24)  T(F): 99.7 (12 Apr 2020 07:23), Max: 101.2 (11 Apr 2020 20:24)  HR: 110 (12 Apr 2020 09:45) (98 - 110)  BP: 146/76 (12 Apr 2020 10:50) (133/68 - 156/78)  BP(mean): --  RR: 15 (12 Apr 2020 10:50) (15 - 20)  SpO2: 100% (12 Apr 2020 10:50) (99% - 100%)  Daily     Daily     Appearance: Alert, responsive, in no acute distress.    RLE: adela brace in appropriate position. Ex fix noted, in place. all pin sites C/D/I. No erythema, no active drainage.  Dressings: 2 prevenas noted, functioning under good seal. No erythema around prevena dressings. prevenas removed for wound check - knee incision sutures intact, +granulation tissue noted, mild active bleeding, no purulent material. medial/posterior ankle incision sutures intact without active bleeding/drainage, no purulent material noted. new prevena dressings placed on knee and ankle. unroofed blister at medial ankle dressed with xeroform prior to prevena placement. pin sites cleaned and re-dressed w xeroform  Motor: + EHL/FHL. +ROM toes. SILT. DP2+. BCR, no cyanosis.     RUE: Splint and sling C/D/I. + ROM phalanges. ext warm cap refill brisk. SILT    A/P: 44 y.o M s/p CR right wrist/R knee I&D, patella tendon repair, right open talus reductiion and ExFix, POD#5    - Prevena for 5-7 days - then dry dressings  - NWB RUE/RLE  - Cont. IV ABX  - Pain control as clinically indicated  - DVTP as prescribed  - cont care primary team

## 2020-04-13 LAB
ANION GAP SERPL CALC-SCNC: 13 MMOL/L — SIGNIFICANT CHANGE UP (ref 5–17)
BASOPHILS # BLD AUTO: 0.03 K/UL — SIGNIFICANT CHANGE UP (ref 0–0.2)
BASOPHILS NFR BLD AUTO: 0.3 % — SIGNIFICANT CHANGE UP (ref 0–2)
BUN SERPL-MCNC: 22 MG/DL — HIGH (ref 8–20)
CALCIUM SERPL-MCNC: 8.6 MG/DL — SIGNIFICANT CHANGE UP (ref 8.6–10.2)
CHLORIDE SERPL-SCNC: 101 MMOL/L — SIGNIFICANT CHANGE UP (ref 98–107)
CK MB CFR SERPL CALC: 1.4 NG/ML — SIGNIFICANT CHANGE UP (ref 0–6.7)
CK SERPL-CCNC: 645 U/L — HIGH (ref 30–200)
CO2 SERPL-SCNC: 21 MMOL/L — LOW (ref 22–29)
CREAT SERPL-MCNC: 1.23 MG/DL — SIGNIFICANT CHANGE UP (ref 0.5–1.3)
EOSINOPHIL # BLD AUTO: 0.34 K/UL — SIGNIFICANT CHANGE UP (ref 0–0.5)
EOSINOPHIL NFR BLD AUTO: 2.9 % — SIGNIFICANT CHANGE UP (ref 0–6)
GLUCOSE SERPL-MCNC: 119 MG/DL — HIGH (ref 70–99)
HCT VFR BLD CALC: 24.4 % — LOW (ref 39–50)
HGB BLD-MCNC: 8.1 G/DL — LOW (ref 13–17)
IMM GRANULOCYTES NFR BLD AUTO: 1.8 % — HIGH (ref 0–1.5)
LYMPHOCYTES # BLD AUTO: 1.16 K/UL — SIGNIFICANT CHANGE UP (ref 1–3.3)
LYMPHOCYTES # BLD AUTO: 10 % — LOW (ref 13–44)
MAGNESIUM SERPL-MCNC: 2.3 MG/DL — SIGNIFICANT CHANGE UP (ref 1.6–2.6)
MCHC RBC-ENTMCNC: 29.8 PG — SIGNIFICANT CHANGE UP (ref 27–34)
MCHC RBC-ENTMCNC: 33.2 GM/DL — SIGNIFICANT CHANGE UP (ref 32–36)
MCV RBC AUTO: 89.7 FL — SIGNIFICANT CHANGE UP (ref 80–100)
MONOCYTES # BLD AUTO: 1.62 K/UL — HIGH (ref 0–0.9)
MONOCYTES NFR BLD AUTO: 14 % — SIGNIFICANT CHANGE UP (ref 2–14)
NEUTROPHILS # BLD AUTO: 8.19 K/UL — HIGH (ref 1.8–7.4)
NEUTROPHILS NFR BLD AUTO: 71 % — SIGNIFICANT CHANGE UP (ref 43–77)
PHOSPHATE SERPL-MCNC: 3.1 MG/DL — SIGNIFICANT CHANGE UP (ref 2.4–4.7)
PLATELET # BLD AUTO: 242 K/UL — SIGNIFICANT CHANGE UP (ref 150–400)
POTASSIUM SERPL-MCNC: 5 MMOL/L — SIGNIFICANT CHANGE UP (ref 3.5–5.3)
POTASSIUM SERPL-SCNC: 5 MMOL/L — SIGNIFICANT CHANGE UP (ref 3.5–5.3)
RBC # BLD: 2.72 M/UL — LOW (ref 4.2–5.8)
RBC # FLD: 14.4 % — SIGNIFICANT CHANGE UP (ref 10.3–14.5)
SODIUM SERPL-SCNC: 135 MMOL/L — SIGNIFICANT CHANGE UP (ref 135–145)
WBC # BLD: 11.55 K/UL — HIGH (ref 3.8–10.5)
WBC # FLD AUTO: 11.55 K/UL — HIGH (ref 3.8–10.5)

## 2020-04-13 PROCEDURE — 99233 SBSQ HOSP IP/OBS HIGH 50: CPT

## 2020-04-13 PROCEDURE — 93970 EXTREMITY STUDY: CPT | Mod: 26

## 2020-04-13 RX ORDER — ACETAMINOPHEN 500 MG
650 TABLET ORAL EVERY 6 HOURS
Refills: 0 | Status: DISCONTINUED | OUTPATIENT
Start: 2020-04-13 | End: 2020-04-16

## 2020-04-13 RX ORDER — BACITRACIN ZINC 500 UNIT/G
1 OINTMENT IN PACKET (EA) TOPICAL
Refills: 0 | Status: DISCONTINUED | OUTPATIENT
Start: 2020-04-13 | End: 2020-04-16

## 2020-04-13 RX ORDER — LACTULOSE 10 G/15ML
20 SOLUTION ORAL DAILY
Refills: 0 | Status: DISCONTINUED | OUTPATIENT
Start: 2020-04-13 | End: 2020-04-16

## 2020-04-13 RX ADMIN — OXYCODONE HYDROCHLORIDE 10 MILLIGRAM(S): 5 TABLET ORAL at 10:38

## 2020-04-13 RX ADMIN — OXYCODONE HYDROCHLORIDE 5 MILLIGRAM(S): 5 TABLET ORAL at 07:57

## 2020-04-13 RX ADMIN — SENNA PLUS 2 TABLET(S): 8.6 TABLET ORAL at 00:31

## 2020-04-13 RX ADMIN — PANTOPRAZOLE SODIUM 40 MILLIGRAM(S): 20 TABLET, DELAYED RELEASE ORAL at 07:57

## 2020-04-13 RX ADMIN — CHLORHEXIDINE GLUCONATE 1 APPLICATION(S): 213 SOLUTION TOPICAL at 10:39

## 2020-04-13 RX ADMIN — Medication 975 MILLIGRAM(S): at 06:12

## 2020-04-13 RX ADMIN — HYDROMORPHONE HYDROCHLORIDE 0.5 MILLIGRAM(S): 2 INJECTION INTRAMUSCULAR; INTRAVENOUS; SUBCUTANEOUS at 21:23

## 2020-04-13 RX ADMIN — SENNA PLUS 2 TABLET(S): 8.6 TABLET ORAL at 21:24

## 2020-04-13 RX ADMIN — HYDROMORPHONE HYDROCHLORIDE 4 MILLIGRAM(S): 2 INJECTION INTRAMUSCULAR; INTRAVENOUS; SUBCUTANEOUS at 18:06

## 2020-04-13 RX ADMIN — OXYCODONE HYDROCHLORIDE 5 MILLIGRAM(S): 5 TABLET ORAL at 13:57

## 2020-04-13 RX ADMIN — OXYCODONE HYDROCHLORIDE 10 MILLIGRAM(S): 5 TABLET ORAL at 02:29

## 2020-04-13 RX ADMIN — Medication 1 APPLICATION(S): at 18:00

## 2020-04-13 RX ADMIN — ENOXAPARIN SODIUM 30 MILLIGRAM(S): 100 INJECTION SUBCUTANEOUS at 18:00

## 2020-04-13 RX ADMIN — Medication 100 MILLIGRAM(S): at 06:12

## 2020-04-13 RX ADMIN — SODIUM CHLORIDE 125 MILLILITER(S): 9 INJECTION INTRAMUSCULAR; INTRAVENOUS; SUBCUTANEOUS at 00:41

## 2020-04-13 RX ADMIN — ENOXAPARIN SODIUM 30 MILLIGRAM(S): 100 INJECTION SUBCUTANEOUS at 06:12

## 2020-04-13 RX ADMIN — HYDROMORPHONE HYDROCHLORIDE 0.5 MILLIGRAM(S): 2 INJECTION INTRAMUSCULAR; INTRAVENOUS; SUBCUTANEOUS at 15:30

## 2020-04-13 RX ADMIN — Medication 10 MILLIGRAM(S): at 11:17

## 2020-04-13 NOTE — PROGRESS NOTE ADULT - SUBJECTIVE AND OBJECTIVE BOX
INTERVAL HPI/OVERNIGHT EVENTS:    Patient seen and evaluated at bedside and found tachycardic and mild fever of 100.6 but in no acute distress. Patient without any new complaints. Dressings changed by ortho on . Blood cultures from  pending preliminary results.     SUBJECTIVE:      MEDICATIONS  (STANDING):  acetaminophen   Tablet .. 975 milliGRAM(s) Oral every 8 hours  bisacodyl Suppository 10 milliGRAM(s) Rectal daily  ceFAZolin   IVPB      ceFAZolin   IVPB 2000 milliGRAM(s) IV Intermittent every 8 hours  chlorhexidine 4% Liquid 1 Application(s) Topical <User Schedule>  enoxaparin Injectable 30 milliGRAM(s) SubCutaneous every 12 hours  pantoprazole    Tablet 40 milliGRAM(s) Oral before breakfast  polyethylene glycol 3350 17 Gram(s) Oral daily  senna 2 Tablet(s) Oral at bedtime  sodium chloride 0.9%. 1000 milliLiter(s) (125 mL/Hr) IV Continuous <Continuous>    MEDICATIONS  (PRN):  HYDROmorphone   Tablet 4 milliGRAM(s) Oral every 3 hours PRN Severe Pain (7 - 10)  HYDROmorphone  Injectable 0.5 milliGRAM(s) IV Push every 3 hours PRN breakthru  oxyCODONE    IR 5 milliGRAM(s) Oral every 3 hours PRN Mild Pain (1 - 3)  oxyCODONE    IR 10 milliGRAM(s) Oral every 3 hours PRN Moderate Pain (4 - 6)  sodium chloride 0.9% lock flush 10 milliLiter(s) IV Push every 1 hour PRN Pre/post blood products, medications, blood draw, and to maintain line patency      Vital Signs Last 24 Hrs  T(C): 38.1 (2020 20:40), Max: 38.3 (2020 02:52)  T(F): 100.6 (2020 20:40), Max: 101 (2020 02:52)  HR: 110 (2020 20:40) (98 - 110)  BP: 138/91 (2020 20:40) (130/80 - 156/78)  BP(mean): --  RR: 19 (2020 20:40) (15 - 20)  SpO2: 100% (2020 20:40) (98% - 100%)    PHYSICAL EXAM:  GEN: NAD  HEENT: NCAT, clear oral mucosa, normal conjunctiva  Chest: non-labored breathing or conversational dyspnea   GI: soft, nontender, dressing in place c/d/i, non-distended  MSK: RLE in ex-fix and brace Prevena vac in place with good seal.  RUE splinted and in a sling.  vasc: 2+ DP b/l        I&O's Detail    2020 07:01  -  2020 07:00  --------------------------------------------------------  IN:    multiple electrolytes Injection Type 1multiple electrolytes Injection Type 1: 1375 mL  Total IN: 1375 mL    OUT:    Voided: 1625 mL    Voided: 650 mL  Total OUT: 2275 mL    Total NET: -900 mL      2020 07:01  -  2020 02:18  --------------------------------------------------------  IN:    multiple electrolytes Injection Type 1multiple electrolytes Injection Type 1: 625 mL    Oral Fluid: 960 mL    sodium chloride 0.9%.: 875 mL  Total IN: 2460 mL    OUT:    Voided: 3450 mL  Total OUT: 3450 mL    Total NET: -990 mL          LABS:                        8.4    11.15 )-----------( 222      ( 2020 09:48 )             25.4     04-12    133<L>  |  98  |  19.0  ----------------------------<  118<H>  4.7   |  23.0  |  1.22    Ca    8.7      2020 09:48  Phos  2.8     04-12  Mg     2.1     04-12        Urinalysis Basic - ( 2020 06:54 )    Color: Yellow / Appearance: Clear / S.010 / pH: x  Gluc: x / Ketone: Negative  / Bili: Negative / Urobili: 4 mg/dL   Blood: x / Protein: 30 mg/dL / Nitrite: Negative   Leuk Esterase: Negative / RBC: 0-2 /HPF / WBC 0-2   Sq Epi: x / Non Sq Epi: Negative / Bacteria: Negative        RADIOLOGY & ADDITIONAL STUDIES:    ASSESSMENT AND PLAN:
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
## 2020-04-13 NOTE — PROGRESS NOTE ADULT - SUBJECTIVE AND OBJECTIVE BOX
JANES ALDRICH    [id number]    History:  The patient is status post right wrist closed reduction, right knee I&D with patella tendon repair, right ankle open reduction of talus and ExFix, POD#6. Patient is doing well. The patient's pain is controlled using the prescribed pain medications. The patient is participating in physical therapy. He was unable to get OOB secondary to pain. Denies nausea, vomiting, chest pain, shortness of breath, abdominal pain or fever. No new complaints. No acute motor or sensory changes are reported.    Vital Signs Last 24 Hrs  T(C): 37.5 (13 Apr 2020 08:28), Max: 38.1 (12 Apr 2020 20:40)  T(F): 99.5 (13 Apr 2020 08:28), Max: 100.6 (12 Apr 2020 20:40)  HR: 116 (13 Apr 2020 08:28) (108 - 116)  BP: 163/99 (13 Apr 2020 08:28) (118/85 - 163/99)  BP(mean): --  RR: 19 (13 Apr 2020 08:28) (15 - 19)  SpO2: 100% (13 Apr 2020 08:28) (98% - 100%)  I&O's Summary    12 Apr 2020 07:01  -  13 Apr 2020 07:00  --------------------------------------------------------  IN: 2460 mL / OUT: 3775 mL / NET: -1315 mL                              8.1    11.55 )-----------( 242      ( 13 Apr 2020 04:53 )             24.4     04-13    135  |  101  |  22.0<H>  ----------------------------<  119<H>  5.0   |  21.0<L>  |  1.23    Ca    8.6      13 Apr 2020 04:53  Phos  3.1     04-13  Mg     2.3     04-13        MEDICATIONS  (STANDING):  bisacodyl Suppository 10 milliGRAM(s) Rectal daily  chlorhexidine 4% Liquid 1 Application(s) Topical <User Schedule>  enoxaparin Injectable 30 milliGRAM(s) SubCutaneous every 12 hours  pantoprazole    Tablet 40 milliGRAM(s) Oral before breakfast  polyethylene glycol 3350 17 Gram(s) Oral daily  senna 2 Tablet(s) Oral at bedtime  sodium chloride 0.9%. 1000 milliLiter(s) (125 mL/Hr) IV Continuous <Continuous>    MEDICATIONS  (PRN):  acetaminophen   Tablet .. 650 milliGRAM(s) Oral every 6 hours PRN Temp greater or equal to 38.5C (101.3F), Mild Pain (1 - 3)  HYDROmorphone   Tablet 4 milliGRAM(s) Oral every 3 hours PRN Severe Pain (7 - 10)  HYDROmorphone  Injectable 0.5 milliGRAM(s) IV Push every 3 hours PRN breakthru  oxyCODONE    IR 5 milliGRAM(s) Oral every 3 hours PRN Mild Pain (1 - 3)  oxyCODONE    IR 10 milliGRAM(s) Oral every 3 hours PRN Moderate Pain (4 - 6)  sodium chloride 0.9% lock flush 10 milliLiter(s) IV Push every 1 hour PRN Pre/post blood products, medications, blood draw, and to maintain line patency      Physical exam: Lying in bed in NAD, awake and alert  Right upper extremity- Well padded splint is in good position. +ROM fingers. Sensation intact. Brisk cap refill.  Right lower extremity- ExFix intact. Prevena vac dressings intact. Knee vac functioning with no output in cannister. Ankle vac not functioning, has about 25cc bloody output in cannister. Some drainage from ankle pin site. Thigh compartments soft and compressible. Calf soft, NT. Lower leg compartments soft and compressible. No wound erythema. Sensation to light touch is grossly intact distally. Motor function distally is 5/5. No foot drop. + weak EHL/FHL. 2+ dorsalis pedis pulse. Capillary refill is less than 2 seconds. No cyanosis.    Primary Orthopedic Assessment:  • S/P CR right distal radius, Right patella tendon repair/I&D and right ankle open reduction talus/ExFix, POD#6    Plan:   • DVT prophylaxis as prescribed-Lovenox, including use of compression devices and ankle pumps  • Continue physical therapy  • Non-weight bearing of the RUE/RLE, No ROM right knee  • Continue splint and knee immobilizer as applied  • Elevation of the splinted extremities  • Pain control as clinically indicated  • Incentive spirometry encouraged  - Continue prevena vac dressings x 4-6 more days then check wound and dry dressings  - Ankle prevena vac machine replaced and new cannister. Now functioning.  - Continue to monitor  -Trauma management

## 2020-04-14 LAB
ANION GAP SERPL CALC-SCNC: 13 MMOL/L — SIGNIFICANT CHANGE UP (ref 5–17)
ANISOCYTOSIS BLD QL: SLIGHT — SIGNIFICANT CHANGE UP
BASOPHILS # BLD AUTO: 0.14 K/UL — SIGNIFICANT CHANGE UP (ref 0–0.2)
BASOPHILS NFR BLD AUTO: 0.9 % — SIGNIFICANT CHANGE UP (ref 0–2)
BUN SERPL-MCNC: 25 MG/DL — HIGH (ref 8–20)
CALCIUM SERPL-MCNC: 9 MG/DL — SIGNIFICANT CHANGE UP (ref 8.6–10.2)
CHLORIDE SERPL-SCNC: 97 MMOL/L — LOW (ref 98–107)
CO2 SERPL-SCNC: 22 MMOL/L — SIGNIFICANT CHANGE UP (ref 22–29)
CREAT SERPL-MCNC: 1.39 MG/DL — HIGH (ref 0.5–1.3)
DRUG SCREEN, SERUM: SIGNIFICANT CHANGE UP
ELLIPTOCYTES BLD QL SMEAR: SLIGHT — SIGNIFICANT CHANGE UP
EOSINOPHIL # BLD AUTO: 0.14 K/UL — SIGNIFICANT CHANGE UP (ref 0–0.5)
EOSINOPHIL NFR BLD AUTO: 0.9 % — SIGNIFICANT CHANGE UP (ref 0–6)
GIANT PLATELETS BLD QL SMEAR: PRESENT — SIGNIFICANT CHANGE UP
GLUCOSE SERPL-MCNC: 130 MG/DL — HIGH (ref 70–99)
HCT VFR BLD CALC: 24.1 % — LOW (ref 39–50)
HGB BLD-MCNC: 7.9 G/DL — LOW (ref 13–17)
HYPOCHROMIA BLD QL: SLIGHT — SIGNIFICANT CHANGE UP
LYMPHOCYTES # BLD AUTO: 1.09 K/UL — SIGNIFICANT CHANGE UP (ref 1–3.3)
LYMPHOCYTES # BLD AUTO: 6.9 % — LOW (ref 13–44)
MACROCYTES BLD QL: SLIGHT — SIGNIFICANT CHANGE UP
MAGNESIUM SERPL-MCNC: 2.4 MG/DL — SIGNIFICANT CHANGE UP (ref 1.6–2.6)
MANUAL SMEAR VERIFICATION: SIGNIFICANT CHANGE UP
MCHC RBC-ENTMCNC: 29.5 PG — SIGNIFICANT CHANGE UP (ref 27–34)
MCHC RBC-ENTMCNC: 32.8 GM/DL — SIGNIFICANT CHANGE UP (ref 32–36)
MCV RBC AUTO: 89.9 FL — SIGNIFICANT CHANGE UP (ref 80–100)
METAMYELOCYTES # FLD: 2.6 % — HIGH (ref 0–0)
MICROCYTES BLD QL: SLIGHT — SIGNIFICANT CHANGE UP
MONOCYTES # BLD AUTO: 1.51 K/UL — HIGH (ref 0–0.9)
MONOCYTES NFR BLD AUTO: 9.6 % — SIGNIFICANT CHANGE UP (ref 2–14)
NEUTROPHILS # BLD AUTO: 12.07 K/UL — HIGH (ref 1.8–7.4)
NEUTROPHILS NFR BLD AUTO: 76.5 % — SIGNIFICANT CHANGE UP (ref 43–77)
OVALOCYTES BLD QL SMEAR: SLIGHT — SIGNIFICANT CHANGE UP
PHOSPHATE SERPL-MCNC: 3.3 MG/DL — SIGNIFICANT CHANGE UP (ref 2.4–4.7)
PLAT MORPH BLD: NORMAL — SIGNIFICANT CHANGE UP
PLATELET # BLD AUTO: 305 K/UL — SIGNIFICANT CHANGE UP (ref 150–400)
PLATELET COUNT - ESTIMATE: NORMAL — SIGNIFICANT CHANGE UP
POIKILOCYTOSIS BLD QL AUTO: SLIGHT — SIGNIFICANT CHANGE UP
POTASSIUM SERPL-MCNC: 4.9 MMOL/L — SIGNIFICANT CHANGE UP (ref 3.5–5.3)
POTASSIUM SERPL-SCNC: 4.9 MMOL/L — SIGNIFICANT CHANGE UP (ref 3.5–5.3)
RBC # BLD: 2.68 M/UL — LOW (ref 4.2–5.8)
RBC # FLD: 14.2 % — SIGNIFICANT CHANGE UP (ref 10.3–14.5)
RBC BLD AUTO: NORMAL — SIGNIFICANT CHANGE UP
SODIUM SERPL-SCNC: 132 MMOL/L — LOW (ref 135–145)
VARIANT LYMPHS # BLD: 2.6 % — SIGNIFICANT CHANGE UP (ref 0–6)
WBC # BLD: 15.78 K/UL — HIGH (ref 3.8–10.5)
WBC # FLD AUTO: 15.78 K/UL — HIGH (ref 3.8–10.5)

## 2020-04-14 PROCEDURE — 99231 SBSQ HOSP IP/OBS SF/LOW 25: CPT

## 2020-04-14 PROCEDURE — 99232 SBSQ HOSP IP/OBS MODERATE 35: CPT

## 2020-04-14 RX ORDER — SODIUM CHLORIDE 9 MG/ML
1000 INJECTION INTRAMUSCULAR; INTRAVENOUS; SUBCUTANEOUS
Refills: 0 | Status: DISCONTINUED | OUTPATIENT
Start: 2020-04-14 | End: 2020-04-16

## 2020-04-14 RX ADMIN — HYDROMORPHONE HYDROCHLORIDE 0.5 MILLIGRAM(S): 2 INJECTION INTRAMUSCULAR; INTRAVENOUS; SUBCUTANEOUS at 08:20

## 2020-04-14 RX ADMIN — Medication 1 APPLICATION(S): at 17:24

## 2020-04-14 RX ADMIN — OXYCODONE HYDROCHLORIDE 10 MILLIGRAM(S): 5 TABLET ORAL at 06:35

## 2020-04-14 RX ADMIN — HYDROMORPHONE HYDROCHLORIDE 4 MILLIGRAM(S): 2 INJECTION INTRAMUSCULAR; INTRAVENOUS; SUBCUTANEOUS at 22:13

## 2020-04-14 RX ADMIN — Medication 1 APPLICATION(S): at 07:14

## 2020-04-14 RX ADMIN — ENOXAPARIN SODIUM 30 MILLIGRAM(S): 100 INJECTION SUBCUTANEOUS at 06:35

## 2020-04-14 RX ADMIN — SENNA PLUS 2 TABLET(S): 8.6 TABLET ORAL at 22:13

## 2020-04-14 RX ADMIN — Medication 10 MILLIGRAM(S): at 12:00

## 2020-04-14 RX ADMIN — HYDROMORPHONE HYDROCHLORIDE 4 MILLIGRAM(S): 2 INJECTION INTRAMUSCULAR; INTRAVENOUS; SUBCUTANEOUS at 00:08

## 2020-04-14 RX ADMIN — HYDROMORPHONE HYDROCHLORIDE 4 MILLIGRAM(S): 2 INJECTION INTRAMUSCULAR; INTRAVENOUS; SUBCUTANEOUS at 11:49

## 2020-04-14 RX ADMIN — HYDROMORPHONE HYDROCHLORIDE 0.5 MILLIGRAM(S): 2 INJECTION INTRAMUSCULAR; INTRAVENOUS; SUBCUTANEOUS at 16:16

## 2020-04-14 RX ADMIN — PANTOPRAZOLE SODIUM 40 MILLIGRAM(S): 20 TABLET, DELAYED RELEASE ORAL at 06:35

## 2020-04-14 RX ADMIN — LACTULOSE 20 GRAM(S): 10 SOLUTION ORAL at 17:25

## 2020-04-14 RX ADMIN — ENOXAPARIN SODIUM 30 MILLIGRAM(S): 100 INJECTION SUBCUTANEOUS at 17:25

## 2020-04-14 NOTE — PROGRESS NOTE ADULT - SUBJECTIVE AND OBJECTIVE BOX
INTERVAL HPI/OVERNIGHT EVENTS:    Patient seen this AM with no acute events overnight. Patient without any acute complaints at this time. Limited involvement with rehab team secondary to pain, will continue to appropriately titrate narcotic pain meds. Otherwise, patients' pain is well controlled on current pain regiment. Tolerating diet without any n/v, has been having normal bowel function. Remains hemodynamically stable and denies fevers, chills. No CP or SOB     MEDICATIONS  (STANDING):  BACItracin   Ointment 1 Application(s) Topical two times a day  bisacodyl Suppository 10 milliGRAM(s) Rectal daily  enoxaparin Injectable 30 milliGRAM(s) SubCutaneous every 12 hours  lactulose Syrup 20 Gram(s) Oral daily  pantoprazole    Tablet 40 milliGRAM(s) Oral before breakfast  senna 2 Tablet(s) Oral at bedtime    MEDICATIONS  (PRN):  acetaminophen   Tablet .. 650 milliGRAM(s) Oral every 6 hours PRN Temp greater or equal to 38.5C (101.3F), Mild Pain (1 - 3)  HYDROmorphone   Tablet 4 milliGRAM(s) Oral every 3 hours PRN Severe Pain (7 - 10)  HYDROmorphone  Injectable 0.5 milliGRAM(s) IV Push every 3 hours PRN breakthru  oxyCODONE    IR 5 milliGRAM(s) Oral every 3 hours PRN Mild Pain (1 - 3)  oxyCODONE    IR 10 milliGRAM(s) Oral every 3 hours PRN Moderate Pain (4 - 6)  sodium chloride 0.9% lock flush 10 milliLiter(s) IV Push every 1 hour PRN Pre/post blood products, medications, blood draw, and to maintain line patency      Vital Signs Last 24 Hrs  T(C): 37.7 (13 Apr 2020 21:20), Max: 38.1 (13 Apr 2020 04:15)  T(F): 99.9 (13 Apr 2020 21:20), Max: 100.6 (13 Apr 2020 04:15)  HR: 114 (13 Apr 2020 21:20) (111 - 116)  BP: 144/77 (13 Apr 2020 21:20) (118/85 - 163/99)  BP(mean): --  RR: 18 (13 Apr 2020 21:20) (18 - 21)  SpO2: 98% (13 Apr 2020 21:20) (97% - 100%)      PHYSICAL EXAM:  GEN: NAD  HEENT: NCAT, clear oral mucosa, normal conjunctiva  Chest: non-labored breathing or conversational dyspnea   GI: soft, nontender, dressing in place c/d/i, non-distended  MSK: RLE in ex-fix and brace Prevena vac in place with good seal.  RUE splinted and in a sling.  vasc: 2+ DP b/l        I&O's Detail    12 Apr 2020 07:01  -  13 Apr 2020 07:00  --------------------------------------------------------  IN:    multiple electrolytes Injection Type 1multiple electrolytes Injection Type 1: 625 mL    Oral Fluid: 960 mL    sodium chloride 0.9%: 875 mL  Total IN: 2460 mL    OUT:    Voided: 3775 mL  Total OUT: 3775 mL    Total NET: -1315 mL      13 Apr 2020 07:01  -  14 Apr 2020 01:39  --------------------------------------------------------  IN:    Oral Fluid: 480 mL    sodium chloride 0.9%: 500 mL  Total IN: 980 mL    OUT:    VAC (Vacuum Assisted Closure) System: 25 mL  Total OUT: 25 mL    Total NET: 955 mL          LABS:                        8.1    11.55 )-----------( 242      ( 13 Apr 2020 04:53 )             24.4     04-13    135  |  101  |  22.0<H>  ----------------------------<  119<H>  5.0   |  21.0<L>  |  1.23    Ca    8.6      13 Apr 2020 04:53  Phos  3.1     04-13  Mg     2.3     04-13            RADIOLOGY & ADDITIONAL STUDIES:

## 2020-04-14 NOTE — DIETITIAN INITIAL EVALUATION ADULT. - PERTINENT LABORATORY DATA
04-14 Na132 mmol/L<L> Glu 130 mg/dL<H> K+ 4.9 mmol/L Cr  1.39 mg/dL<H> BUN 25.0 mg/dL<H> Phos 3.3 mg/dL
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
## 2020-04-14 NOTE — PROGRESS NOTE ADULT - SUBJECTIVE AND OBJECTIVE BOX
JANES ALDRICH    [id number]    History:  The patient is status post right wrist closed reduction, right knee I&D/patella tendon repair, right ankle open reduction talus with ExFix, POD # 7. Patient is doing well. The patient's pain is controlled using the prescribed pain medications. The patient is participating in physical therapy. Denies nausea, vomiting, chest pain, shortness of breath, abdominal pain or fever. No new complaints. No acute motor or sensory changes are reported.    Vital Signs Last 24 Hrs  T(C): 37.9 (14 Apr 2020 06:23), Max: 37.9 (14 Apr 2020 06:23)  T(F): 100.2 (14 Apr 2020 06:23), Max: 100.2 (14 Apr 2020 06:23)  HR: 84 (14 Apr 2020 06:23) (84 - 114)  BP: 116/71 (14 Apr 2020 06:23) (116/71 - 156/81)  BP(mean): --  RR: 18 (14 Apr 2020 06:23) (18 - 21)  SpO2: 95% (14 Apr 2020 06:23) (95% - 100%)  I&O's Summary    13 Apr 2020 07:01  -  14 Apr 2020 07:00  --------------------------------------------------------  IN: 980 mL / OUT: 1150 mL / NET: -170 mL                              7.9    15.78 )-----------( 305      ( 14 Apr 2020 09:27 )             24.1     04-13    135  |  101  |  22.0<H>  ----------------------------<  119<H>  5.0   |  21.0<L>  |  1.23    Ca    8.6      13 Apr 2020 04:53  Phos  3.1     04-13  Mg     2.3     04-13        MEDICATIONS  (STANDING):  BACItracin   Ointment 1 Application(s) Topical two times a day  bisacodyl Suppository 10 milliGRAM(s) Rectal daily  enoxaparin Injectable 30 milliGRAM(s) SubCutaneous every 12 hours  lactulose Syrup 20 Gram(s) Oral daily  pantoprazole    Tablet 40 milliGRAM(s) Oral before breakfast  senna 2 Tablet(s) Oral at bedtime    MEDICATIONS  (PRN):  acetaminophen   Tablet .. 650 milliGRAM(s) Oral every 6 hours PRN Temp greater or equal to 38.5C (101.3F), Mild Pain (1 - 3)  HYDROmorphone   Tablet 4 milliGRAM(s) Oral every 3 hours PRN Severe Pain (7 - 10)  HYDROmorphone  Injectable 0.5 milliGRAM(s) IV Push every 3 hours PRN breakthru  oxyCODONE    IR 5 milliGRAM(s) Oral every 3 hours PRN Mild Pain (1 - 3)  oxyCODONE    IR 10 milliGRAM(s) Oral every 3 hours PRN Moderate Pain (4 - 6)  sodium chloride 0.9% lock flush 10 milliLiter(s) IV Push every 1 hour PRN Pre/post blood products, medications, blood draw, and to maintain line patency      Physical exam: Lying in bed in NAD, awake and alert  Right upper extremity- Well padded splint is in good position. +ROM fingers. Sensation intact distally. Brisk cap refill.  Right lower extremity- The prevena vac dressings are intact and functioning, no output in knee vac, minimal output in ankle vac. No wound erythema, discharge, drainage is noted. Thigh soft. Knee swelling improved. Lower leg compartments soft and compressible. No calf tenderness. Sensation to light touch is grossly intact distally. Motor function distally is 5/5. No foot drop. Weak ehl/fhl.  2+ dorsalis pedis pulse. Capillary refill is less than 2 seconds. No cyanosis.    Primary Orthopedic Assessment:  • S/P CR right distal radius, I&D/patella tendon repair, ankle reduction ExFix RLE, POD#7    Plan:   • DVT prophylaxis as prescribed- Lovenox, including use of compression devices and ankle pumps  • Continue physical therapy  • Non-weight bearing of the RUE/RLE, No ROM right knee  • Continue splint and adela brace as applied  • Elevation of the splinted extremity  • Pain control as clinically indicated  • Incentive spirometry encouraged  -continue prevena vac dressings  • Discharge planning – anticipated discharge is subacute rehabilitation
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
## 2020-04-15 LAB
ANION GAP SERPL CALC-SCNC: 14 MMOL/L — SIGNIFICANT CHANGE UP (ref 5–17)
BUN SERPL-MCNC: 27 MG/DL — HIGH (ref 8–20)
CALCIUM SERPL-MCNC: 8.8 MG/DL — SIGNIFICANT CHANGE UP (ref 8.6–10.2)
CHLORIDE SERPL-SCNC: 99 MMOL/L — SIGNIFICANT CHANGE UP (ref 98–107)
CK MB CFR SERPL CALC: 2.2 NG/ML — SIGNIFICANT CHANGE UP (ref 0–6.7)
CK SERPL-CCNC: 864 U/L — HIGH (ref 30–200)
CO2 SERPL-SCNC: 21 MMOL/L — LOW (ref 22–29)
CREAT SERPL-MCNC: 1.32 MG/DL — HIGH (ref 0.5–1.3)
GLUCOSE SERPL-MCNC: 142 MG/DL — HIGH (ref 70–99)
HCT VFR BLD CALC: 23.3 % — LOW (ref 39–50)
HGB BLD-MCNC: 7.5 G/DL — LOW (ref 13–17)
MCHC RBC-ENTMCNC: 29.3 PG — SIGNIFICANT CHANGE UP (ref 27–34)
MCHC RBC-ENTMCNC: 32.2 GM/DL — SIGNIFICANT CHANGE UP (ref 32–36)
MCV RBC AUTO: 91 FL — SIGNIFICANT CHANGE UP (ref 80–100)
PLATELET # BLD AUTO: 368 K/UL — SIGNIFICANT CHANGE UP (ref 150–400)
POTASSIUM SERPL-MCNC: 4.8 MMOL/L — SIGNIFICANT CHANGE UP (ref 3.5–5.3)
POTASSIUM SERPL-SCNC: 4.8 MMOL/L — SIGNIFICANT CHANGE UP (ref 3.5–5.3)
RBC # BLD: 2.56 M/UL — LOW (ref 4.2–5.8)
RBC # FLD: 14.2 % — SIGNIFICANT CHANGE UP (ref 10.3–14.5)
SARS-COV-2 RNA SPEC QL NAA+PROBE: SIGNIFICANT CHANGE UP
SODIUM SERPL-SCNC: 134 MMOL/L — LOW (ref 135–145)
WBC # BLD: 14.53 K/UL — HIGH (ref 3.8–10.5)
WBC # FLD AUTO: 14.53 K/UL — HIGH (ref 3.8–10.5)

## 2020-04-15 PROCEDURE — 99231 SBSQ HOSP IP/OBS SF/LOW 25: CPT

## 2020-04-15 PROCEDURE — 99232 SBSQ HOSP IP/OBS MODERATE 35: CPT

## 2020-04-15 RX ADMIN — Medication 1 APPLICATION(S): at 16:55

## 2020-04-15 RX ADMIN — HYDROMORPHONE HYDROCHLORIDE 4 MILLIGRAM(S): 2 INJECTION INTRAMUSCULAR; INTRAVENOUS; SUBCUTANEOUS at 10:08

## 2020-04-15 RX ADMIN — PANTOPRAZOLE SODIUM 40 MILLIGRAM(S): 20 TABLET, DELAYED RELEASE ORAL at 05:06

## 2020-04-15 RX ADMIN — HYDROMORPHONE HYDROCHLORIDE 0.5 MILLIGRAM(S): 2 INJECTION INTRAMUSCULAR; INTRAVENOUS; SUBCUTANEOUS at 12:06

## 2020-04-15 RX ADMIN — SODIUM CHLORIDE 200 MILLILITER(S): 9 INJECTION INTRAMUSCULAR; INTRAVENOUS; SUBCUTANEOUS at 14:07

## 2020-04-15 RX ADMIN — SODIUM CHLORIDE 125 MILLILITER(S): 9 INJECTION INTRAMUSCULAR; INTRAVENOUS; SUBCUTANEOUS at 13:44

## 2020-04-15 RX ADMIN — HYDROMORPHONE HYDROCHLORIDE 4 MILLIGRAM(S): 2 INJECTION INTRAMUSCULAR; INTRAVENOUS; SUBCUTANEOUS at 05:06

## 2020-04-15 RX ADMIN — ENOXAPARIN SODIUM 30 MILLIGRAM(S): 100 INJECTION SUBCUTANEOUS at 05:07

## 2020-04-15 RX ADMIN — ENOXAPARIN SODIUM 30 MILLIGRAM(S): 100 INJECTION SUBCUTANEOUS at 16:55

## 2020-04-15 RX ADMIN — SODIUM CHLORIDE 200 MILLILITER(S): 9 INJECTION INTRAMUSCULAR; INTRAVENOUS; SUBCUTANEOUS at 21:16

## 2020-04-15 RX ADMIN — SENNA PLUS 2 TABLET(S): 8.6 TABLET ORAL at 21:16

## 2020-04-15 RX ADMIN — HYDROMORPHONE HYDROCHLORIDE 4 MILLIGRAM(S): 2 INJECTION INTRAMUSCULAR; INTRAVENOUS; SUBCUTANEOUS at 21:43

## 2020-04-15 RX ADMIN — Medication 650 MILLIGRAM(S): at 16:54

## 2020-04-15 RX ADMIN — HYDROMORPHONE HYDROCHLORIDE 4 MILLIGRAM(S): 2 INJECTION INTRAMUSCULAR; INTRAVENOUS; SUBCUTANEOUS at 13:53

## 2020-04-15 RX ADMIN — Medication 1 APPLICATION(S): at 05:07

## 2020-04-15 RX ADMIN — LACTULOSE 20 GRAM(S): 10 SOLUTION ORAL at 12:06

## 2020-04-15 NOTE — PROGRESS NOTE ADULT - SUBJECTIVE AND OBJECTIVE BOX
INTERVAL HPI/OVERNIGHT EVENTS:    Patient seen this AM with no acute events overnight. Patient without any acute complaints at this time. Patients' pain is well controlled on current pain regiment. Tolerating diet without any n/v, has been having normal bowel function. Remains hemodynamically stable and denies fevers, chills. No CP or SOB       MEDICATIONS  (STANDING):  BACItracin   Ointment 1 Application(s) Topical two times a day  bisacodyl Suppository 10 milliGRAM(s) Rectal daily  enoxaparin Injectable 30 milliGRAM(s) SubCutaneous every 12 hours  lactulose Syrup 20 Gram(s) Oral daily  pantoprazole    Tablet 40 milliGRAM(s) Oral before breakfast  senna 2 Tablet(s) Oral at bedtime  sodium chloride 0.9%. 1000 milliLiter(s) (125 mL/Hr) IV Continuous <Continuous>    MEDICATIONS  (PRN):  acetaminophen   Tablet .. 650 milliGRAM(s) Oral every 6 hours PRN Temp greater or equal to 38.5C (101.3F), Mild Pain (1 - 3)  HYDROmorphone   Tablet 4 milliGRAM(s) Oral every 3 hours PRN Severe Pain (7 - 10)  HYDROmorphone  Injectable 0.5 milliGRAM(s) IV Push every 3 hours PRN breakthru  oxyCODONE    IR 5 milliGRAM(s) Oral every 3 hours PRN Mild Pain (1 - 3)  oxyCODONE    IR 10 milliGRAM(s) Oral every 3 hours PRN Moderate Pain (4 - 6)  sodium chloride 0.9% lock flush 10 milliLiter(s) IV Push every 1 hour PRN Pre/post blood products, medications, blood draw, and to maintain line patency      Vital Signs Last 24 Hrs  T(C): 37.1 (14 Apr 2020 16:00), Max: 37.9 (14 Apr 2020 06:23)  T(F): 98.8 (14 Apr 2020 16:00), Max: 100.2 (14 Apr 2020 06:23)  HR: 98 (14 Apr 2020 16:00) (84 - 107)  BP: 136/72 (14 Apr 2020 16:00) (116/71 - 136/72)  BP(mean): --  RR: 18 (14 Apr 2020 16:00) (18 - 18)  SpO2: 96% (14 Apr 2020 16:00) (93% - 96%)      PHYSICAL EXAM:  GEN: NAD  HEENT: NCAT, clear oral mucosa, normal conjunctiva, Right IJ TLC in place  Chest: non-labored breathing or conversational dyspnea   GI: soft, nontender, dressing in place c/d/i, non-distended  MSK: RLE in ex-fix and brace Prevena vac in place with good seal.  RUE splinted and in a sling sensation and ROM intact  vasc: 2+ DP b/l      I&O's Detail    13 Apr 2020 07:01  -  14 Apr 2020 07:00  --------------------------------------------------------  IN:    Oral Fluid: 480 mL    sodium chloride 0.9%: 500 mL  Total IN: 980 mL    OUT:    VAC (Vacuum Assisted Closure) System: 25 mL    Voided: 1125 mL  Total OUT: 1150 mL    Total NET: -170 mL          LABS:                        7.9    15.78 )-----------( 305      ( 14 Apr 2020 09:27 )             24.1     04-14    132<L>  |  97<L>  |  25.0<H>  ----------------------------<  130<H>  4.9   |  22.0  |  1.39<H>    Ca    9.0      14 Apr 2020 09:27  Phos  3.3     04-14  Mg     2.4     04-14            RADIOLOGY & ADDITIONAL STUDIES:
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
## 2020-04-16 ENCOUNTER — TRANSCRIPTION ENCOUNTER (OUTPATIENT)
Age: 50
End: 2020-04-16

## 2020-04-16 VITALS
TEMPERATURE: 100 F | SYSTOLIC BLOOD PRESSURE: 128 MMHG | OXYGEN SATURATION: 97 % | DIASTOLIC BLOOD PRESSURE: 72 MMHG | RESPIRATION RATE: 18 BRPM | HEART RATE: 95 BPM

## 2020-04-16 LAB
ALBUMIN SERPL ELPH-MCNC: 2.6 G/DL — LOW (ref 3.3–5.2)
ALP SERPL-CCNC: 405 U/L — HIGH (ref 40–120)
ALT FLD-CCNC: 182 U/L — HIGH
ANION GAP SERPL CALC-SCNC: 13 MMOL/L — SIGNIFICANT CHANGE UP (ref 5–17)
ANISOCYTOSIS BLD QL: SLIGHT — SIGNIFICANT CHANGE UP
AST SERPL-CCNC: 203 U/L — HIGH
BASOPHILS # BLD AUTO: 0 K/UL — SIGNIFICANT CHANGE UP (ref 0–0.2)
BASOPHILS NFR BLD AUTO: 0 % — SIGNIFICANT CHANGE UP (ref 0–2)
BILIRUB SERPL-MCNC: 1.4 MG/DL — SIGNIFICANT CHANGE UP (ref 0.4–2)
BUN SERPL-MCNC: 23 MG/DL — HIGH (ref 8–20)
CALCIUM SERPL-MCNC: 8.5 MG/DL — LOW (ref 8.6–10.2)
CHLORIDE SERPL-SCNC: 100 MMOL/L — SIGNIFICANT CHANGE UP (ref 98–107)
CK MB CFR SERPL CALC: 1.6 NG/ML — SIGNIFICANT CHANGE UP (ref 0–6.7)
CK SERPL-CCNC: 801 U/L — HIGH (ref 30–200)
CO2 SERPL-SCNC: 21 MMOL/L — LOW (ref 22–29)
CREAT SERPL-MCNC: 1.14 MG/DL — SIGNIFICANT CHANGE UP (ref 0.5–1.3)
ELLIPTOCYTES BLD QL SMEAR: SLIGHT — SIGNIFICANT CHANGE UP
EOSINOPHIL # BLD AUTO: 0.34 K/UL — SIGNIFICANT CHANGE UP (ref 0–0.5)
EOSINOPHIL NFR BLD AUTO: 2.6 % — SIGNIFICANT CHANGE UP (ref 0–6)
GIANT PLATELETS BLD QL SMEAR: PRESENT — SIGNIFICANT CHANGE UP
GLUCOSE SERPL-MCNC: 114 MG/DL — HIGH (ref 70–99)
HCT VFR BLD CALC: 22.8 % — LOW (ref 39–50)
HGB BLD-MCNC: 7.3 G/DL — LOW (ref 13–17)
LYMPHOCYTES # BLD AUTO: 1.46 K/UL — SIGNIFICANT CHANGE UP (ref 1–3.3)
LYMPHOCYTES # BLD AUTO: 11.3 % — LOW (ref 13–44)
MACROCYTES BLD QL: SLIGHT — SIGNIFICANT CHANGE UP
MAGNESIUM SERPL-MCNC: 2 MG/DL — SIGNIFICANT CHANGE UP (ref 1.6–2.6)
MANUAL SMEAR VERIFICATION: SIGNIFICANT CHANGE UP
MCHC RBC-ENTMCNC: 29.1 PG — SIGNIFICANT CHANGE UP (ref 27–34)
MCHC RBC-ENTMCNC: 32 GM/DL — SIGNIFICANT CHANGE UP (ref 32–36)
MCV RBC AUTO: 90.8 FL — SIGNIFICANT CHANGE UP (ref 80–100)
METAMYELOCYTES # FLD: 1.7 % — HIGH (ref 0–0)
MICROCYTES BLD QL: SLIGHT — SIGNIFICANT CHANGE UP
MONOCYTES # BLD AUTO: 1.46 K/UL — HIGH (ref 0–0.9)
MONOCYTES NFR BLD AUTO: 11.3 % — SIGNIFICANT CHANGE UP (ref 2–14)
MYELOCYTES NFR BLD: 1.8 % — HIGH (ref 0–0)
NEUTROPHILS # BLD AUTO: 9.22 K/UL — HIGH (ref 1.8–7.4)
NEUTROPHILS NFR BLD AUTO: 71.3 % — SIGNIFICANT CHANGE UP (ref 43–77)
OVALOCYTES BLD QL SMEAR: SLIGHT — SIGNIFICANT CHANGE UP
PHOSPHATE SERPL-MCNC: 2.9 MG/DL — SIGNIFICANT CHANGE UP (ref 2.4–4.7)
PLAT MORPH BLD: NORMAL — SIGNIFICANT CHANGE UP
PLATELET # BLD AUTO: 395 K/UL — SIGNIFICANT CHANGE UP (ref 150–400)
PLATELET COUNT - ESTIMATE: NORMAL — SIGNIFICANT CHANGE UP
POIKILOCYTOSIS BLD QL AUTO: SLIGHT — SIGNIFICANT CHANGE UP
POTASSIUM SERPL-MCNC: 4.9 MMOL/L — SIGNIFICANT CHANGE UP (ref 3.5–5.3)
POTASSIUM SERPL-SCNC: 4.9 MMOL/L — SIGNIFICANT CHANGE UP (ref 3.5–5.3)
PROT SERPL-MCNC: 6.2 G/DL — LOW (ref 6.6–8.7)
RBC # BLD: 2.51 M/UL — LOW (ref 4.2–5.8)
RBC # FLD: 14.2 % — SIGNIFICANT CHANGE UP (ref 10.3–14.5)
RBC BLD AUTO: NORMAL — SIGNIFICANT CHANGE UP
SODIUM SERPL-SCNC: 134 MMOL/L — LOW (ref 135–145)
WBC # BLD: 12.93 K/UL — HIGH (ref 3.8–10.5)
WBC # FLD AUTO: 12.93 K/UL — HIGH (ref 3.8–10.5)

## 2020-04-16 PROCEDURE — 80053 COMPREHEN METABOLIC PANEL: CPT

## 2020-04-16 PROCEDURE — 83735 ASSAY OF MAGNESIUM: CPT

## 2020-04-16 PROCEDURE — C1713: CPT

## 2020-04-16 PROCEDURE — 73130 X-RAY EXAM OF HAND: CPT

## 2020-04-16 PROCEDURE — 86901 BLOOD TYPING SEROLOGIC RH(D): CPT

## 2020-04-16 PROCEDURE — 96365 THER/PROPH/DIAG IV INF INIT: CPT | Mod: XU

## 2020-04-16 PROCEDURE — 82803 BLOOD GASES ANY COMBINATION: CPT

## 2020-04-16 PROCEDURE — 75635 CT ANGIO ABDOMINAL ARTERIES: CPT

## 2020-04-16 PROCEDURE — 73700 CT LOWER EXTREMITY W/O DYE: CPT

## 2020-04-16 PROCEDURE — 81001 URINALYSIS AUTO W/SCOPE: CPT

## 2020-04-16 PROCEDURE — 36415 COLL VENOUS BLD VENIPUNCTURE: CPT

## 2020-04-16 PROCEDURE — 84100 ASSAY OF PHOSPHORUS: CPT

## 2020-04-16 PROCEDURE — P9016: CPT

## 2020-04-16 PROCEDURE — 97167 OT EVAL HIGH COMPLEX 60 MIN: CPT

## 2020-04-16 PROCEDURE — 72125 CT NECK SPINE W/O DYE: CPT

## 2020-04-16 PROCEDURE — 82550 ASSAY OF CK (CPK): CPT

## 2020-04-16 PROCEDURE — 73610 X-RAY EXAM OF ANKLE: CPT

## 2020-04-16 PROCEDURE — 99232 SBSQ HOSP IP/OBS MODERATE 35: CPT

## 2020-04-16 PROCEDURE — 85730 THROMBOPLASTIN TIME PARTIAL: CPT

## 2020-04-16 PROCEDURE — 97110 THERAPEUTIC EXERCISES: CPT

## 2020-04-16 PROCEDURE — 87635 SARS-COV-2 COVID-19 AMP PRB: CPT

## 2020-04-16 PROCEDURE — 86850 RBC ANTIBODY SCREEN: CPT

## 2020-04-16 PROCEDURE — C1889: CPT

## 2020-04-16 PROCEDURE — 73200 CT UPPER EXTREMITY W/O DYE: CPT

## 2020-04-16 PROCEDURE — 83690 ASSAY OF LIPASE: CPT

## 2020-04-16 PROCEDURE — 36430 TRANSFUSION BLD/BLD COMPNT: CPT

## 2020-04-16 PROCEDURE — 74177 CT ABD & PELVIS W/CONTRAST: CPT

## 2020-04-16 PROCEDURE — 93970 EXTREMITY STUDY: CPT

## 2020-04-16 PROCEDURE — 72170 X-RAY EXAM OF PELVIS: CPT

## 2020-04-16 PROCEDURE — 97112 NEUROMUSCULAR REEDUCATION: CPT

## 2020-04-16 PROCEDURE — G0390: CPT

## 2020-04-16 PROCEDURE — 86923 COMPATIBILITY TEST ELECTRIC: CPT

## 2020-04-16 PROCEDURE — 99291 CRITICAL CARE FIRST HOUR: CPT | Mod: 25

## 2020-04-16 PROCEDURE — 76000 FLUOROSCOPY <1 HR PHYS/QHP: CPT

## 2020-04-16 PROCEDURE — 71260 CT THORAX DX C+: CPT

## 2020-04-16 PROCEDURE — 87040 BLOOD CULTURE FOR BACTERIA: CPT

## 2020-04-16 PROCEDURE — 90715 TDAP VACCINE 7 YRS/> IM: CPT

## 2020-04-16 PROCEDURE — 71045 X-RAY EXAM CHEST 1 VIEW: CPT

## 2020-04-16 PROCEDURE — 86900 BLOOD TYPING SEROLOGIC ABO: CPT

## 2020-04-16 PROCEDURE — 85027 COMPLETE CBC AUTOMATED: CPT

## 2020-04-16 PROCEDURE — 80048 BASIC METABOLIC PNL TOTAL CA: CPT

## 2020-04-16 PROCEDURE — 80307 DRUG TEST PRSMV CHEM ANLYZR: CPT

## 2020-04-16 PROCEDURE — 97530 THERAPEUTIC ACTIVITIES: CPT

## 2020-04-16 PROCEDURE — 83605 ASSAY OF LACTIC ACID: CPT

## 2020-04-16 PROCEDURE — 82553 CREATINE MB FRACTION: CPT

## 2020-04-16 PROCEDURE — 85610 PROTHROMBIN TIME: CPT

## 2020-04-16 PROCEDURE — 73562 X-RAY EXAM OF KNEE 3: CPT

## 2020-04-16 PROCEDURE — 97163 PT EVAL HIGH COMPLEX 45 MIN: CPT

## 2020-04-16 PROCEDURE — 96375 TX/PRO/DX INJ NEW DRUG ADDON: CPT

## 2020-04-16 PROCEDURE — 70450 CT HEAD/BRAIN W/O DYE: CPT

## 2020-04-16 RX ORDER — SENNA PLUS 8.6 MG/1
2 TABLET ORAL
Qty: 0 | Refills: 0 | DISCHARGE
Start: 2020-04-16

## 2020-04-16 RX ORDER — OXYCODONE HYDROCHLORIDE 5 MG/1
1 TABLET ORAL
Qty: 0 | Refills: 0 | DISCHARGE
Start: 2020-04-16

## 2020-04-16 RX ORDER — BACITRACIN ZINC 500 UNIT/G
1 OINTMENT IN PACKET (EA) TOPICAL
Qty: 0 | Refills: 0 | DISCHARGE
Start: 2020-04-16

## 2020-04-16 RX ORDER — ENOXAPARIN SODIUM 100 MG/ML
30 INJECTION SUBCUTANEOUS
Qty: 0 | Refills: 0 | DISCHARGE
Start: 2020-04-16 | End: 2020-05-14

## 2020-04-16 RX ORDER — ACETAMINOPHEN 500 MG
2 TABLET ORAL
Qty: 0 | Refills: 0 | DISCHARGE
Start: 2020-04-16

## 2020-04-16 RX ORDER — LACTULOSE 10 G/15ML
30 SOLUTION ORAL
Qty: 0 | Refills: 0 | DISCHARGE
Start: 2020-04-16

## 2020-04-16 RX ORDER — PANTOPRAZOLE SODIUM 20 MG/1
1 TABLET, DELAYED RELEASE ORAL
Qty: 0 | Refills: 0 | DISCHARGE
Start: 2020-04-16

## 2020-04-16 RX ADMIN — HYDROMORPHONE HYDROCHLORIDE 4 MILLIGRAM(S): 2 INJECTION INTRAMUSCULAR; INTRAVENOUS; SUBCUTANEOUS at 03:24

## 2020-04-16 RX ADMIN — Medication 1 APPLICATION(S): at 04:56

## 2020-04-16 RX ADMIN — SODIUM CHLORIDE 200 MILLILITER(S): 9 INJECTION INTRAMUSCULAR; INTRAVENOUS; SUBCUTANEOUS at 04:56

## 2020-04-16 RX ADMIN — PANTOPRAZOLE SODIUM 40 MILLIGRAM(S): 20 TABLET, DELAYED RELEASE ORAL at 05:25

## 2020-04-16 RX ADMIN — HYDROMORPHONE HYDROCHLORIDE 4 MILLIGRAM(S): 2 INJECTION INTRAMUSCULAR; INTRAVENOUS; SUBCUTANEOUS at 15:19

## 2020-04-16 RX ADMIN — Medication 10 MILLIGRAM(S): at 11:46

## 2020-04-16 RX ADMIN — ENOXAPARIN SODIUM 30 MILLIGRAM(S): 100 INJECTION SUBCUTANEOUS at 05:25

## 2020-04-16 RX ADMIN — LACTULOSE 20 GRAM(S): 10 SOLUTION ORAL at 11:45

## 2020-04-16 RX ADMIN — Medication 650 MILLIGRAM(S): at 16:28

## 2020-04-16 RX ADMIN — HYDROMORPHONE HYDROCHLORIDE 4 MILLIGRAM(S): 2 INJECTION INTRAMUSCULAR; INTRAVENOUS; SUBCUTANEOUS at 11:44

## 2020-04-16 NOTE — PROGRESS NOTE ADULT - SUBJECTIVE AND OBJECTIVE BOX
INTERVAL HPI/OVERNIGHT EVENTS:    Patient seen and evaluated at bedside and found with mild fever and asymptomatic tachycardia and in no acute distress. WBC downtrending. No acute events overnight. Pain is well controlled with PO pain medication. Tolerating diet, without nausea or emesis, passing gas and having bowel movements. Denies fevers, chills, chest pain, SOB, coughing, dizziness, n/v/d, or generalized malaise.       SUBJECTIVE:      MEDICATIONS  (STANDING):  BACItracin   Ointment 1 Application(s) Topical two times a day  bisacodyl Suppository 10 milliGRAM(s) Rectal daily  enoxaparin Injectable 30 milliGRAM(s) SubCutaneous every 12 hours  lactulose Syrup 20 Gram(s) Oral daily  pantoprazole    Tablet 40 milliGRAM(s) Oral before breakfast  senna 2 Tablet(s) Oral at bedtime  sodium chloride 0.9%. 1000 milliLiter(s) (200 mL/Hr) IV Continuous <Continuous>    MEDICATIONS  (PRN):  acetaminophen   Tablet .. 650 milliGRAM(s) Oral every 6 hours PRN Temp greater or equal to 38.5C (101.3F), Mild Pain (1 - 3)  HYDROmorphone   Tablet 4 milliGRAM(s) Oral every 3 hours PRN Severe Pain (7 - 10)  HYDROmorphone  Injectable 0.5 milliGRAM(s) IV Push every 3 hours PRN breakthru  oxyCODONE    IR 5 milliGRAM(s) Oral every 3 hours PRN Mild Pain (1 - 3)  oxyCODONE    IR 10 milliGRAM(s) Oral every 3 hours PRN Moderate Pain (4 - 6)  sodium chloride 0.9% lock flush 10 milliLiter(s) IV Push every 1 hour PRN Pre/post blood products, medications, blood draw, and to maintain line patency      Vital Signs Last 24 Hrs  T(C): 37.8 (15 Apr 2020 21:21), Max: 38.6 (15 Apr 2020 16:00)  T(F): 100 (15 Apr 2020 21:21), Max: 101.4 (15 Apr 2020 16:00)  HR: 111 (15 Apr 2020 21:21) (95 - 113)  BP: 147/77 (15 Apr 2020 21:21) (120/75 - 147/77)  BP(mean): --  RR: 18 (15 Apr 2020 21:21) (18 - 18)  SpO2: 99% (15 Apr 2020 21:21) (97% - 99%)    PHYSICAL EXAM:    General: lying in bed in no acute distress  HEENT: Neck supple  Chest: non-labored breathing or conversational dyspnea   Abdomen: non-distended, soft and depressible, non-tender. No guarding or rebound, non-peritonitic  Ext:  RLE in ex-fix and brace Prevena vac in place with good seal.  RUE splinted and in a sling sensation and ROM intact      I&O's Detail    15 Apr 2020 07:01  -  16 Apr 2020 01:55  --------------------------------------------------------  IN:    Oral Fluid: 240 mL    sodium chloride 0.9%.: 625 mL  Total IN: 865 mL    OUT:    Voided: 400 mL  Total OUT: 400 mL    Total NET: 465 mL          LABS:                        7.5    14.53 )-----------( 368      ( 15 Apr 2020 09:21 )             23.3     04-15    134<L>  |  99  |  27.0<H>  ----------------------------<  142<H>  4.8   |  21.0<L>  |  1.32<H>    Ca    8.8      15 Apr 2020 09:21  Phos  3.3     04-14  Mg     2.4     04-14

## 2020-04-16 NOTE — CHART NOTE - NSCHARTNOTEFT_GEN_A_CORE
Pt ready for d/c to rehab today  right IJ central line removed, pressure held, no bleeding noted  dressing applied
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
## 2020-04-16 NOTE — DISCHARGE NOTE NURSING/CASE MANAGEMENT/SOCIAL WORK - NSDCPELOVENOX_GEN_ALL_CORE
Enoxaparin/Lovenox - Potential for adverse drug reactions and interactions/Enoxaparin/Lovenox - Compliance/Enoxaparin/Lovenox - Dietary Advice/Enoxaparin/Lovenox - Follow up monitoring Non-Graft Cartilage Fenestration Text: The cartilage was fenestrated with a 2mm punch biopsy to help facilitate healing.
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
## 2020-04-16 NOTE — DISCHARGE NOTE NURSING/CASE MANAGEMENT/SOCIAL WORK - PATIENT PORTAL LINK FT
You can access the FollowMyHealth Patient Portal offered by [facility] by registering at the following website: [email or web address]. By joining [employer]’s FollowMyHealth portal, you will also be able to view your health information using other applications (apps) compatible with our system.

## 2020-04-17 LAB
CULTURE RESULTS: SIGNIFICANT CHANGE UP
SPECIMEN SOURCE: SIGNIFICANT CHANGE UP

## 2020-05-08 PROBLEM — Z78.9 OTHER SPECIFIED HEALTH STATUS: Chronic | Status: ACTIVE | Noted: 2020-04-07

## 2020-05-12 ENCOUNTER — APPOINTMENT (OUTPATIENT)
Dept: ORTHOPEDIC SURGERY | Facility: CLINIC | Age: 50
End: 2020-05-12

## 2020-05-20 ENCOUNTER — INPATIENT (INPATIENT)
Facility: HOSPITAL | Age: 50
LOS: 5 days | Discharge: HOME HEALTH | DRG: 492 | End: 2020-05-26
Attending: INTERNAL MEDICINE | Admitting: INTERNAL MEDICINE
Payer: MEDICARE

## 2020-05-20 ENCOUNTER — APPOINTMENT (OUTPATIENT)
Dept: ORTHOPEDIC SURGERY | Facility: CLINIC | Age: 50
End: 2020-05-20
Payer: MEDICARE

## 2020-05-20 VITALS
TEMPERATURE: 98 F | RESPIRATION RATE: 18 BRPM | SYSTOLIC BLOOD PRESSURE: 115 MMHG | HEART RATE: 77 BPM | DIASTOLIC BLOOD PRESSURE: 66 MMHG | OXYGEN SATURATION: 98 %

## 2020-05-20 VITALS
WEIGHT: 190 LBS | HEIGHT: 70 IN | HEART RATE: 84 BPM | TEMPERATURE: 97.9 F | SYSTOLIC BLOOD PRESSURE: 104 MMHG | BODY MASS INDEX: 27.2 KG/M2 | DIASTOLIC BLOOD PRESSURE: 67 MMHG

## 2020-05-20 DIAGNOSIS — S82.899A OTHER FRACTURE OF UNSPECIFIED LOWER LEG, INITIAL ENCOUNTER FOR CLOSED FRACTURE: ICD-10-CM

## 2020-05-20 DIAGNOSIS — S82.091A OTHER FRACTURE OF RIGHT PATELLA, INITIAL ENCOUNTER FOR CLOSED FRACTURE: ICD-10-CM
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
## 2020-05-20 DIAGNOSIS — S52.571A OTHER INTRAARTICULAR FRACTURE OF LOWER END OF RIGHT RADIUS, INITIAL ENCOUNTER FOR CLOSED FRACTURE: ICD-10-CM

## 2020-05-20 DIAGNOSIS — Z98.890 OTHER SPECIFIED POSTPROCEDURAL STATES: Chronic | ICD-10-CM

## 2020-05-20 DIAGNOSIS — M25.531 PAIN IN RIGHT WRIST: ICD-10-CM

## 2020-05-20 LAB
ALBUMIN SERPL ELPH-MCNC: 3.7 G/DL — SIGNIFICANT CHANGE UP (ref 3.3–5)
ALP SERPL-CCNC: 325 U/L — HIGH (ref 40–120)
ALT FLD-CCNC: 53 U/L — SIGNIFICANT CHANGE UP (ref 12–78)
ANION GAP SERPL CALC-SCNC: 8 MMOL/L — SIGNIFICANT CHANGE UP (ref 5–17)
APTT BLD: 37.3 SEC — HIGH (ref 27.5–36.3)
AST SERPL-CCNC: 28 U/L — SIGNIFICANT CHANGE UP (ref 15–37)
BASOPHILS # BLD AUTO: 0.02 K/UL — SIGNIFICANT CHANGE UP (ref 0–0.2)
BASOPHILS NFR BLD AUTO: 0.3 % — SIGNIFICANT CHANGE UP (ref 0–2)
BILIRUB SERPL-MCNC: 0.9 MG/DL — SIGNIFICANT CHANGE UP (ref 0.2–1.2)
BUN SERPL-MCNC: 23 MG/DL — SIGNIFICANT CHANGE UP (ref 7–23)
CALCIUM SERPL-MCNC: 10.2 MG/DL — HIGH (ref 8.5–10.1)
CHLORIDE SERPL-SCNC: 105 MMOL/L — SIGNIFICANT CHANGE UP (ref 96–108)
CO2 SERPL-SCNC: 27 MMOL/L — SIGNIFICANT CHANGE UP (ref 22–31)
CREAT SERPL-MCNC: 1.4 MG/DL — HIGH (ref 0.5–1.3)
EOSINOPHIL # BLD AUTO: 0.16 K/UL — SIGNIFICANT CHANGE UP (ref 0–0.5)
EOSINOPHIL NFR BLD AUTO: 2.6 % — SIGNIFICANT CHANGE UP (ref 0–6)
GLUCOSE SERPL-MCNC: 109 MG/DL — HIGH (ref 70–99)
HCT VFR BLD CALC: 39.2 % — SIGNIFICANT CHANGE UP (ref 39–50)
HGB BLD-MCNC: 12.2 G/DL — LOW (ref 13–17)
IMM GRANULOCYTES NFR BLD AUTO: 0.3 % — SIGNIFICANT CHANGE UP (ref 0–1.5)
INR BLD: 1.15 RATIO — SIGNIFICANT CHANGE UP (ref 0.88–1.16)
LYMPHOCYTES # BLD AUTO: 1.12 K/UL — SIGNIFICANT CHANGE UP (ref 1–3.3)
LYMPHOCYTES # BLD AUTO: 18.1 % — SIGNIFICANT CHANGE UP (ref 13–44)
MCHC RBC-ENTMCNC: 27.5 PG — SIGNIFICANT CHANGE UP (ref 27–34)
MCHC RBC-ENTMCNC: 31.1 GM/DL — LOW (ref 32–36)
MCV RBC AUTO: 88.3 FL — SIGNIFICANT CHANGE UP (ref 80–100)
MONOCYTES # BLD AUTO: 0.53 K/UL — SIGNIFICANT CHANGE UP (ref 0–0.9)
MONOCYTES NFR BLD AUTO: 8.6 % — SIGNIFICANT CHANGE UP (ref 2–14)
NEUTROPHILS # BLD AUTO: 4.34 K/UL — SIGNIFICANT CHANGE UP (ref 1.8–7.4)
NEUTROPHILS NFR BLD AUTO: 70.1 % — SIGNIFICANT CHANGE UP (ref 43–77)
PLATELET # BLD AUTO: 301 K/UL — SIGNIFICANT CHANGE UP (ref 150–400)
POTASSIUM SERPL-MCNC: 3.9 MMOL/L — SIGNIFICANT CHANGE UP (ref 3.5–5.3)
POTASSIUM SERPL-SCNC: 3.9 MMOL/L — SIGNIFICANT CHANGE UP (ref 3.5–5.3)
PROT SERPL-MCNC: 8.3 GM/DL — SIGNIFICANT CHANGE UP (ref 6–8.3)
PROTHROM AB SERPL-ACNC: 12.8 SEC — SIGNIFICANT CHANGE UP (ref 10–12.9)
RBC # BLD: 4.44 M/UL — SIGNIFICANT CHANGE UP (ref 4.2–5.8)
RBC # FLD: 15 % — HIGH (ref 10.3–14.5)
SARS-COV-2 RNA SPEC QL NAA+PROBE: SIGNIFICANT CHANGE UP
SODIUM SERPL-SCNC: 140 MMOL/L — SIGNIFICANT CHANGE UP (ref 135–145)
WBC # BLD: 6.19 K/UL — SIGNIFICANT CHANGE UP (ref 3.8–10.5)
WBC # FLD AUTO: 6.19 K/UL — SIGNIFICANT CHANGE UP (ref 3.8–10.5)

## 2020-05-20 PROCEDURE — 71045 X-RAY EXAM CHEST 1 VIEW: CPT

## 2020-05-20 PROCEDURE — 93005 ELECTROCARDIOGRAM TRACING: CPT

## 2020-05-20 PROCEDURE — 80048 BASIC METABOLIC PNL TOTAL CA: CPT

## 2020-05-20 PROCEDURE — 73590 X-RAY EXAM OF LOWER LEG: CPT | Mod: RT

## 2020-05-20 PROCEDURE — 85730 THROMBOPLASTIN TIME PARTIAL: CPT

## 2020-05-20 PROCEDURE — 85025 COMPLETE CBC W/AUTO DIFF WBC: CPT

## 2020-05-20 PROCEDURE — 97162 PT EVAL MOD COMPLEX 30 MIN: CPT | Mod: GP

## 2020-05-20 PROCEDURE — 73610 X-RAY EXAM OF ANKLE: CPT | Mod: 26,RT

## 2020-05-20 PROCEDURE — 36415 COLL VENOUS BLD VENIPUNCTURE: CPT

## 2020-05-20 PROCEDURE — 71045 X-RAY EXAM CHEST 1 VIEW: CPT | Mod: 26

## 2020-05-20 PROCEDURE — 99223 1ST HOSP IP/OBS HIGH 75: CPT | Mod: AI

## 2020-05-20 PROCEDURE — 85610 PROTHROMBIN TIME: CPT

## 2020-05-20 PROCEDURE — 80202 ASSAY OF VANCOMYCIN: CPT

## 2020-05-20 PROCEDURE — 73110 X-RAY EXAM OF WRIST: CPT | Mod: RT

## 2020-05-20 PROCEDURE — 73610 X-RAY EXAM OF ANKLE: CPT | Mod: RT

## 2020-05-20 PROCEDURE — 85027 COMPLETE CBC AUTOMATED: CPT

## 2020-05-20 PROCEDURE — 73700 CT LOWER EXTREMITY W/O DYE: CPT | Mod: 26,RT

## 2020-05-20 PROCEDURE — 93010 ELECTROCARDIOGRAM REPORT: CPT

## 2020-05-20 PROCEDURE — 73562 X-RAY EXAM OF KNEE 3: CPT | Mod: RT

## 2020-05-20 PROCEDURE — 97530 THERAPEUTIC ACTIVITIES: CPT | Mod: GP

## 2020-05-20 PROCEDURE — 97116 GAIT TRAINING THERAPY: CPT | Mod: GP

## 2020-05-20 PROCEDURE — 73590 X-RAY EXAM OF LOWER LEG: CPT | Mod: 26,RT

## 2020-05-20 PROCEDURE — 99204 OFFICE O/P NEW MOD 45 MIN: CPT | Mod: 25

## 2020-05-20 PROCEDURE — 73110 X-RAY EXAM OF WRIST: CPT | Mod: 26,RT

## 2020-05-20 PROCEDURE — 76376 3D RENDER W/INTRP POSTPROCES: CPT | Mod: 26

## 2020-05-20 RX ORDER — SODIUM CHLORIDE 9 MG/ML
1000 INJECTION, SOLUTION INTRAVENOUS
Refills: 0 | Status: DISCONTINUED | OUTPATIENT
Start: 2020-05-20 | End: 2020-05-21

## 2020-05-20 RX ORDER — OXYCODONE HYDROCHLORIDE 5 MG/1
5 TABLET ORAL EVERY 4 HOURS
Refills: 0 | Status: DISCONTINUED | OUTPATIENT
Start: 2020-05-20 | End: 2020-05-21

## 2020-05-20 RX ORDER — OXYCODONE HYDROCHLORIDE 5 MG/1
10 TABLET ORAL EVERY 12 HOURS
Refills: 0 | Status: DISCONTINUED | OUTPATIENT
Start: 2020-05-20 | End: 2020-05-21

## 2020-05-20 RX ORDER — ENOXAPARIN SODIUM 100 MG/ML
40 INJECTION SUBCUTANEOUS ONCE
Refills: 0 | Status: COMPLETED | OUTPATIENT
Start: 2020-05-20 | End: 2020-05-20

## 2020-05-20 RX ORDER — SENNA PLUS 8.6 MG/1
2 TABLET ORAL AT BEDTIME
Refills: 0 | Status: DISCONTINUED | OUTPATIENT
Start: 2020-05-20 | End: 2020-05-21

## 2020-05-20 RX ORDER — ONDANSETRON 8 MG/1
4 TABLET, FILM COATED ORAL EVERY 6 HOURS
Refills: 0 | Status: DISCONTINUED | OUTPATIENT
Start: 2020-05-20 | End: 2020-05-21

## 2020-05-20 RX ORDER — PANTOPRAZOLE SODIUM 20 MG/1
40 TABLET, DELAYED RELEASE ORAL
Refills: 0 | Status: DISCONTINUED | OUTPATIENT
Start: 2020-05-20 | End: 2020-05-21

## 2020-05-20 RX ORDER — ACETAMINOPHEN 500 MG
650 TABLET ORAL EVERY 4 HOURS
Refills: 0 | Status: DISCONTINUED | OUTPATIENT
Start: 2020-05-20 | End: 2020-05-21

## 2020-05-20 RX ORDER — OXYCODONE HYDROCHLORIDE 5 MG/1
10 TABLET ORAL EVERY 4 HOURS
Refills: 0 | Status: DISCONTINUED | OUTPATIENT
Start: 2020-05-20 | End: 2020-05-21

## 2020-05-20 RX ADMIN — OXYCODONE HYDROCHLORIDE 10 MILLIGRAM(S): 5 TABLET ORAL at 13:02

## 2020-05-20 RX ADMIN — OXYCODONE HYDROCHLORIDE 10 MILLIGRAM(S): 5 TABLET ORAL at 21:00

## 2020-05-20 RX ADMIN — ENOXAPARIN SODIUM 40 MILLIGRAM(S): 100 INJECTION SUBCUTANEOUS at 14:46

## 2020-05-20 NOTE — CONSULT NOTE ADULT - ASSESSMENT
A/P:  48 yo M for removal of external fixator of Right ankle, with right ankle arthroscopy, with chronic right distal radius fracture:  -plan for OR 5/21 for removal of ex fix  -medical clearance documented  -NPO after midnight, IVF while NPO  -hold all chemical dvt ppx after midnight  -SCDs/IS  -NWB RLE in external fixator, R knee in adela locked to 40 degrees  -NWB RUE in removable wrist splint  -pain control  -medical management per primary team  -no plan surgery for the distal radius fracture while admitted  -FU with Dr. Haas once discharged, for possible elective osteotomy of healed right distal radius fracture  -discussed with Dr. Haas, aware and in agreement with above plan

## 2020-05-20 NOTE — ED PROVIDER NOTE - NS ED ROS FT
Review of Systems:  	•	CONSTITUTIONAL: no fever  	•	SKIN: no rash  	•	RESPIRATORY: no shortness of breath  	•	CARDIAC: no chest pain  	•	GI:  no abd pain, no nausea, no vomiting, no diarrhea  	•	GENITO-URINARY:  no dysuria  	•	MUSCULOSKELETAL:  no back pain.   	•	NEUROLOGIC: no weakness  	•	ALLERGY: no rhinorrhea  	•	PSYSCHIATRIC: appropriate concern about symptoms
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
## 2020-05-20 NOTE — H&P ADULT - NSHPPHYSICALEXAM_GEN_ALL_CORE
Vital Signs Last 24 Hrs  T(C): 36.7 (20 May 2020 11:40), Max: 36.7 (20 May 2020 11:40)  T(F): 98 (20 May 2020 11:40), Max: 98 (20 May 2020 11:40)  HR: 77 (20 May 2020 11:40) (77 - 77)  BP: 115/66 (20 May 2020 11:40) (115/66 - 115/66)  BP(mean): --  RR: 18 (20 May 2020 11:40) (18 - 18)  SpO2: 98% (20 May 2020 11:40) (98% - 98%)

## 2020-05-20 NOTE — ED PROVIDER NOTE - PMH
Coccidioidomycosis  spine  Epididymitis    Hypertrophic scar of skin    No pertinent past medical history    Spinal stenosis of thoracolumbar region <<----- Click to add NO pertinent Past Medical History

## 2020-05-20 NOTE — ED PROVIDER NOTE - ATTENDING CONTRIBUTION TO CARE
Attending Attestation:  Zion Parr MD.  I have personally performed a history and physical examination of the patient . I have discussed management with the ACP & the patient.  I reviewed the ACP's note and agree with the documented findings and plan of care.  I have authored and modified critical sections of the Provider Note, including but not limited to HPI, ROS, Physical Exam and MDM.
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
## 2020-05-20 NOTE — DISCUSSION/SUMMARY
[de-identified] : Plan:\par The plan is to have him add to him as the surgical schedule for removal of external fixator with ankle arthroscopy. The risks and benefits of the surgical procedure were discussed with him in great details in the office. All of his and his wife questions were answered. As for his wrist I would like him to followup with hand specialists. Wrist brace given today in office. As for his knee, we'll discuss his knee post external fixator removal. If pain continues consider knee specialists in the future.\par \par I had a lengthy fixation with the patient and his wife regarding his overall injuries. With regards to the right wrist I'm concerned about healing that has already taken place given the timeframe passer passed since the injury. I am placing him in a cockup wrist splint. Regarding the right knee I would like to get him moving with range of motion exercises and removing the brace. As far as the right ankle is concerned he will need surgery to remove the external fixation and possible ankle arthroscopy to remove the bone fragments. I believe he will be best suited by going to the emergency department for admission in preparation for surgery on May 21, 2020. I would like him to be cleared and optimized for surgery. All risks, benefits and alternatives to the recommended surgical procedure were discussed which include but are not limited to bleeding, infection, nerve damage, vascular damage, failure of the wound to heal, the need for further surgery, loss of limb, DVT, PE, loss of life as well as the risks associated with general anesthesia. The patient verbalized understanding and provided informed consent to move forward with surgery.All questions were answered and the patient verbalized understanding.  The patient is in agreement with this treatment plan.\par \par He is aware of the right ankle medial wound and possibility for infection.

## 2020-05-20 NOTE — H&P ADULT - ASSESSMENT
#RT ankle fracture s/p ex-fix  Admit to med-surg  Ortho eval appreciated  NWB to RLE  Pain meds prn  NPO p MN for OR in AM  Lovenox x1 then on hold for OR  Patient is medically optimized for OR    #DVT proph- Lovenox x1 today then on hold for OR    #Pt is asymptomatic and low risk for COVID. Swabbed in ED    #Dispo- inpatient admit. Patient is medically optimized for OR

## 2020-05-20 NOTE — ED ADULT NURSE NOTE - CAS EDN DISCHARGE INTERVENTIONS
Spoke to Ashlyn, pts wife, and scheduled EMU 7/18 per Dr Castrejon. Will send out admission letter. Ashlyn has my direct # for questions/concerns.    Arm band on/Admission wristband placed/IV intact
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
## 2020-05-20 NOTE — H&P ADULT - NSHPLABSRESULTS_GEN_ALL_CORE
12.2   6.19  )-----------( 301      ( 20 May 2020 11:54 )             39.2     20 May 2020 11:54    140    |  105    |  23     ----------------------------<  109    3.9     |  27     |  1.40     Ca    10.2       20 May 2020 11:54    TPro  8.3    /  Alb  3.7    /  TBili  0.9    /  DBili  x      /  AST  28     /  ALT  53     /  AlkPhos  325    20 May 2020 11:54    LIVER FUNCTIONS - ( 20 May 2020 11:54 )  Alb: 3.7 g/dL / Pro: 8.3 gm/dL / ALK PHOS: 325 U/L / ALT: 53 U/L / AST: 28 U/L / GGT: x           PT/INR - ( 20 May 2020 11:54 )   PT: 12.8 sec;   INR: 1.15 ratio       PTT - ( 20 May 2020 11:54 )  PTT:37.3 sec
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
## 2020-05-20 NOTE — ED PROVIDER NOTE - PHYSICAL EXAMINATION
*GEN: No acute distress, well appearing   *HEAD: Normocephalic, Atraumatic  *EYES/NOSE: b/l Pupils symmetric & Reactive to ligth, EOMI b/l  *THROAT: airway patent, moist mucous membranes  *NECK: Neck supple  *PULMONARY: No Respiratory distress, symmetric b/l chest rise  *CARDIAC: s1s2, regular rhythm   *ABDOMEN:  Non Tender, Non Distended, soft, no guarding, no rebound, no masses   *BACK: no CVA tenderness, No midline vertebral tenderness to palpation   *EXTREMITIES: symmetric pulses, 2+ DP & radial pulses, no cyanosis, no edema. External fixator on right ankle. All wounds clean, dry, and intact. Limited ROM LE digits. Pulses 2+. No calf swelling or tenderness.   *SKIN: no rash, no bruising   *NEUROLOGIC: alert,    *PSYCH: appropriate concern about symptoms, pleasant
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
## 2020-05-20 NOTE — H&P ADULT - NSICDXPASTSURGICALHX_GEN_ALL_CORE_FT
PAST SURGICAL HISTORY:  History of thoracic surgery and lumbar (CAGE)    Previous back surgery 2/2 coccidios mycosis

## 2020-05-20 NOTE — PHYSICAL EXAM
[de-identified] : \par \par Physical Exam Right Wrist/Right Knee/Right Ankle:\par \par Physical exam limited to all 3 body parts due to pains. Medial ankle wound. Medial sided knee wound present but healed. External fixator right ankle with no signs of infection at the pin sites. Range of motion limited all 3 joints due to pain and stiffness. Neurovascularly intact.\par \par General: Alert and oriented x3. In no acute distress. Pleasant in nature with a normal affect.  No apparent respiratory distress.\par \par Right knee exam\par \par Skin: Clean, dry, intact, angular wound healing noted. Positive effusion. External brace in place.\par Inspection: No obvious malalignment, no masses, positive swelling, positive effusion\par Pulses: 2+ DP/PT pulses\par ROM: RIGHT limited degrees of flexion. No pain with deep knee flexion. LEFT 120 degrees of flexion. No pain with deep knee flexion. \par Tenderness: Positive MJLT. Positive LJLT. No pain over the patella facets. No pain to the quadriceps mechanism. \par Stability: Stable to varus, valgus, lachman testing. Negative anterior/posterior drawer.\par Strength: 4/5 Q/H/TA/GS/EHL, no atrophy\par \par Right ankle exam\par \par Skin: Clean, dry, intact, external fixation in place. Medial eschar noted no drainage\par Inspection: No obvious malalignment, positive swelling, positive effusion; no lymphadenopathy\par Pulses: 2+ DP/PT pulses\par ROM: RIGHT limited degrees of dorsiflexion, limited degrees of plantarflexion, limited degrees of subtalar motion. LEFT 10 degrees of dorsiflexion, 40 degrees of plantarflexion, 10 degrees of subtalar motion. \par Tenderness: No tenderness over the lateral malleolus, no CFL/ATFL/PTFL pain. No medial malleolus pain, no deltoid ligament pain. No proximal fibular pain. No heel pain.\par Stability: Unable to assess \par Strength: Unable to assess due to the external fixator\par Neuro: In tact to light touch throughout\par \par \par \par \par \par  [de-identified] : Today in the office 3 views of the right knee, 2 views of the right tib-fib, 3 views of the right ankle and right wrist were obtained and reviewed by me.\par \par Right Wrist CT Scan:\par \par Comminuted impacted distal radial metadiaphyseal fracture with interval \par improved alignment of the fracture fragments. Persistent dorsal apex \par angulation of the fracture with overriding of the fracture fragment volarly. \par Posttraumatic positive ulnar variance. Widening of the distal radial ulnar \par joint, improved when compared with prior exam. \par \par Right Knee CT Scan:\par \par Fracture involving the medial aspect of the patella with small ossific \par fragments at the medial and distal aspects of the patella. Limited \par evaluation of the patellar tendon and distal quadriceps tendon on CT. If \par clinically indicated, correlation with MRI can be performed. Small knee \par joint effusion with small intra-articular gas. \par \par Small arthritic changes in the patellofemoral compartment and proximal \par tibiofibular articulation. \par \par \par Right ankle CT Scan:\par \par Postsurgical changes status post placement of external fixation device and \par reduction of previously seen dislocation of the talus. Multiple fractures \par involving the ankle and hindfoot as described above. Comminuted fracture of \par the lateral malleolus. Comminuted fractures of the talus and medial \par calcaneus. Small intra-articular osseous fragments in the tibiotalar joint. \par Nondisplaced fracture at the medial aspect of the navicular bone with \par intra-articular extension. \par \par Irregularity at the posterior lateral margin of the cuboid bone, possible \par nondisplaced fracture. \par \par Corticated ossific structure at the anterior aspect of the anterior process \par of the calcaneus, question old trauma or incomplete osseous coalition. \par \par
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
## 2020-05-20 NOTE — H&P ADULT - NSICDXPASTMEDICALHX_GEN_ALL_CORE_FT
PAST MEDICAL HISTORY:  Coccidioidomycosis spine    Epididymitis     Hypertrophic scar of skin     No pertinent past medical history     Spinal stenosis of thoracolumbar region

## 2020-05-20 NOTE — ED ADULT NURSE NOTE - NSIMPLEMENTINTERV_GEN_ALL_ED
Implemented All Fall with Harm Risk Interventions:  [place] to call system. Call bell, personal items and telephone within reach. Instruct patient to call for assistance. Room bathroom lighting operational. Non-slip footwear when patient is off stretcher. Physically safe environment: no spills, clutter or unnecessary equipment. Stretcher in lowest position, wheels locked, appropriate side rails in place. Provide visual cue, wrist band, yellow gown, etc. Monitor gait and stability. Monitor for mental status changes and reorient to person, place, and time. Review medications for side effects contributing to fall risk. Reinforce activity limits and safety measures with patient and family. Provide visual clues: red socks.

## 2020-05-21 ENCOUNTER — APPOINTMENT (OUTPATIENT)
Dept: ORTHOPEDIC SURGERY | Facility: CLINIC | Age: 50
End: 2020-05-21

## 2020-05-21 LAB
ANION GAP SERPL CALC-SCNC: 7 MMOL/L — SIGNIFICANT CHANGE UP (ref 5–17)
APTT BLD: 34.6 SEC — SIGNIFICANT CHANGE UP (ref 27.5–36.3)
BUN SERPL-MCNC: 23 MG/DL — SIGNIFICANT CHANGE UP (ref 7–23)
CALCIUM SERPL-MCNC: 9.5 MG/DL — SIGNIFICANT CHANGE UP (ref 8.5–10.1)
CHLORIDE SERPL-SCNC: 110 MMOL/L — HIGH (ref 96–108)
CO2 SERPL-SCNC: 25 MMOL/L — SIGNIFICANT CHANGE UP (ref 22–31)
CREAT SERPL-MCNC: 1.31 MG/DL — HIGH (ref 0.5–1.3)
GLUCOSE SERPL-MCNC: 103 MG/DL — HIGH (ref 70–99)
HCT VFR BLD CALC: 34.9 % — LOW (ref 39–50)
HGB BLD-MCNC: 10.8 G/DL — LOW (ref 13–17)
INR BLD: 1.17 RATIO — HIGH (ref 0.88–1.16)
MCHC RBC-ENTMCNC: 26.8 PG — LOW (ref 27–34)
MCHC RBC-ENTMCNC: 30.9 GM/DL — LOW (ref 32–36)
MCV RBC AUTO: 86.6 FL — SIGNIFICANT CHANGE UP (ref 80–100)
PLATELET # BLD AUTO: 257 K/UL — SIGNIFICANT CHANGE UP (ref 150–400)
POTASSIUM SERPL-MCNC: 4.1 MMOL/L — SIGNIFICANT CHANGE UP (ref 3.5–5.3)
POTASSIUM SERPL-SCNC: 4.1 MMOL/L — SIGNIFICANT CHANGE UP (ref 3.5–5.3)
PROTHROM AB SERPL-ACNC: 13.1 SEC — HIGH (ref 10–12.9)
RBC # BLD: 4.03 M/UL — LOW (ref 4.2–5.8)
RBC # FLD: 14.7 % — HIGH (ref 10.3–14.5)
SODIUM SERPL-SCNC: 142 MMOL/L — SIGNIFICANT CHANGE UP (ref 135–145)
WBC # BLD: 4.82 K/UL — SIGNIFICANT CHANGE UP (ref 3.8–10.5)
WBC # FLD AUTO: 4.82 K/UL — SIGNIFICANT CHANGE UP (ref 3.8–10.5)

## 2020-05-21 PROCEDURE — 97607 NEG PRS WND THR NDME<=50SQCM: CPT

## 2020-05-21 PROCEDURE — 97608 NEG PRS WND THER NDME>50SQCM: CPT | Mod: 78,RT

## 2020-05-21 PROCEDURE — 20694 RMVL EXT FIXJ SYS UNDER ANES: CPT | Mod: 78,RT

## 2020-05-21 PROCEDURE — 99232 SBSQ HOSP IP/OBS MODERATE 35: CPT

## 2020-05-21 PROCEDURE — 99221 1ST HOSP IP/OBS SF/LOW 40: CPT | Mod: 57

## 2020-05-21 PROCEDURE — 29898 ANKLE ARTHROSCOPY/SURGERY: CPT | Mod: 78,RT

## 2020-05-21 RX ORDER — DIPHENHYDRAMINE HCL 50 MG
25 CAPSULE ORAL AT BEDTIME
Refills: 0 | Status: DISCONTINUED | OUTPATIENT
Start: 2020-05-21 | End: 2020-05-26

## 2020-05-21 RX ORDER — OXYCODONE HYDROCHLORIDE 5 MG/1
5 TABLET ORAL ONCE
Refills: 0 | Status: DISCONTINUED | OUTPATIENT
Start: 2020-05-21 | End: 2020-05-21

## 2020-05-21 RX ORDER — FOLIC ACID 0.8 MG
1 TABLET ORAL DAILY
Refills: 0 | Status: DISCONTINUED | OUTPATIENT
Start: 2020-05-21 | End: 2020-05-26

## 2020-05-21 RX ORDER — FENTANYL CITRATE 50 UG/ML
50 INJECTION INTRAVENOUS
Refills: 0 | Status: DISCONTINUED | OUTPATIENT
Start: 2020-05-21 | End: 2020-05-21

## 2020-05-21 RX ORDER — OXYCODONE HYDROCHLORIDE 5 MG/1
10 TABLET ORAL ONCE
Refills: 0 | Status: DISCONTINUED | OUTPATIENT
Start: 2020-05-21 | End: 2020-05-21

## 2020-05-21 RX ORDER — SODIUM CHLORIDE 9 MG/ML
1000 INJECTION INTRAMUSCULAR; INTRAVENOUS; SUBCUTANEOUS
Refills: 0 | Status: DISCONTINUED | OUTPATIENT
Start: 2020-05-21 | End: 2020-05-26

## 2020-05-21 RX ORDER — OXYCODONE HYDROCHLORIDE 5 MG/1
10 TABLET ORAL EVERY 4 HOURS
Refills: 0 | Status: DISCONTINUED | OUTPATIENT
Start: 2020-05-21 | End: 2020-05-26

## 2020-05-21 RX ORDER — ASCORBIC ACID 60 MG
500 TABLET,CHEWABLE ORAL
Refills: 0 | Status: DISCONTINUED | OUTPATIENT
Start: 2020-05-21 | End: 2020-05-26

## 2020-05-21 RX ORDER — ONDANSETRON 8 MG/1
4 TABLET, FILM COATED ORAL EVERY 6 HOURS
Refills: 0 | Status: DISCONTINUED | OUTPATIENT
Start: 2020-05-21 | End: 2020-05-26

## 2020-05-21 RX ORDER — ENOXAPARIN SODIUM 100 MG/ML
40 INJECTION SUBCUTANEOUS EVERY 24 HOURS
Refills: 0 | Status: DISCONTINUED | OUTPATIENT
Start: 2020-05-22 | End: 2020-05-26

## 2020-05-21 RX ORDER — CEFAZOLIN SODIUM 1 G
2000 VIAL (EA) INJECTION EVERY 8 HOURS
Refills: 0 | Status: COMPLETED | OUTPATIENT
Start: 2020-05-21 | End: 2020-05-22

## 2020-05-21 RX ORDER — SODIUM CHLORIDE 9 MG/ML
1000 INJECTION, SOLUTION INTRAVENOUS
Refills: 0 | Status: DISCONTINUED | OUTPATIENT
Start: 2020-05-21 | End: 2020-05-21

## 2020-05-21 RX ORDER — OXYCODONE HYDROCHLORIDE 5 MG/1
5 TABLET ORAL EVERY 4 HOURS
Refills: 0 | Status: DISCONTINUED | OUTPATIENT
Start: 2020-05-21 | End: 2020-05-26

## 2020-05-21 RX ORDER — ACETAMINOPHEN 500 MG
650 TABLET ORAL EVERY 6 HOURS
Refills: 0 | Status: DISCONTINUED | OUTPATIENT
Start: 2020-05-21 | End: 2020-05-26

## 2020-05-21 RX ORDER — HYDROMORPHONE HYDROCHLORIDE 2 MG/ML
0.5 INJECTION INTRAMUSCULAR; INTRAVENOUS; SUBCUTANEOUS EVERY 6 HOURS
Refills: 0 | Status: DISCONTINUED | OUTPATIENT
Start: 2020-05-21 | End: 2020-05-26

## 2020-05-21 RX ORDER — FERROUS SULFATE 325(65) MG
325 TABLET ORAL
Refills: 0 | Status: DISCONTINUED | OUTPATIENT
Start: 2020-05-21 | End: 2020-05-26

## 2020-05-21 RX ORDER — ONDANSETRON 8 MG/1
4 TABLET, FILM COATED ORAL ONCE
Refills: 0 | Status: DISCONTINUED | OUTPATIENT
Start: 2020-05-21 | End: 2020-05-21

## 2020-05-21 RX ORDER — MAGNESIUM HYDROXIDE 400 MG/1
30 TABLET, CHEWABLE ORAL DAILY
Refills: 0 | Status: DISCONTINUED | OUTPATIENT
Start: 2020-05-21 | End: 2020-05-26

## 2020-05-21 RX ADMIN — OXYCODONE HYDROCHLORIDE 10 MILLIGRAM(S): 5 TABLET ORAL at 14:54

## 2020-05-21 RX ADMIN — OXYCODONE HYDROCHLORIDE 10 MILLIGRAM(S): 5 TABLET ORAL at 04:45

## 2020-05-21 RX ADMIN — FENTANYL CITRATE 50 MICROGRAM(S): 50 INJECTION INTRAVENOUS at 15:05

## 2020-05-21 RX ADMIN — Medication 100 MILLIGRAM(S): at 20:30

## 2020-05-21 RX ADMIN — FENTANYL CITRATE 50 MICROGRAM(S): 50 INJECTION INTRAVENOUS at 14:54

## 2020-05-21 RX ADMIN — SODIUM CHLORIDE 100 MILLILITER(S): 9 INJECTION INTRAMUSCULAR; INTRAVENOUS; SUBCUTANEOUS at 18:56

## 2020-05-21 RX ADMIN — PANTOPRAZOLE SODIUM 40 MILLIGRAM(S): 20 TABLET, DELAYED RELEASE ORAL at 06:20

## 2020-05-21 RX ADMIN — OXYCODONE HYDROCHLORIDE 10 MILLIGRAM(S): 5 TABLET ORAL at 06:20

## 2020-05-21 RX ADMIN — SODIUM CHLORIDE 75 MILLILITER(S): 9 INJECTION, SOLUTION INTRAVENOUS at 00:05

## 2020-05-21 NOTE — BRIEF OPERATIVE NOTE - NSICDXBRIEFPOSTOP_GEN_ALL_CORE_FT
POST-OP DIAGNOSIS:  Loose body in right ankle 21-May-2020 15:01:43  Aiden Mora  Arthrofibrosis of ankle joint, right 21-May-2020 15:01:36  Aiden Mora  Aftercare involving removal of external fixation device 21-May-2020 15:01:27  Aiden Mora  Wound of right ankle 21-May-2020 15:00:53  Aiden Mora

## 2020-05-21 NOTE — PROGRESS NOTE ADULT - SUBJECTIVE AND OBJECTIVE BOX
Pt seen and examined. Pain well controlled. Pt resting comfortably. No acute events overnight. Plan for OR today.    Vital Signs Last 24 Hrs  T(C): 36.5 (20 May 2020 21:01), Max: 36.9 (20 May 2020 14:29)  T(F): 97.7 (20 May 2020 21:01), Max: 98.4 (20 May 2020 14:29)  HR: 89 (20 May 2020 21:01) (77 - 93)  BP: 110/61 (20 May 2020 21:01) (110/61 - 120/67)  BP(mean): --  RR: 18 (20 May 2020 21:01) (18 - 18)  SpO2: 100% (20 May 2020 21:01) (98% - 100%)    PE:  Gen: NAD  RUE:  Skin intact, no erythema, in a removable wrist splint  Deformity is noted, but no ttp about the wrist  Some pain with ROM of wrist  Compartments soft and compressible  +AIN/PIN/M/U/R/Ax  SILT C5-T2  2+ RP    RLE:  Skin intact, wound healing over right knee, dry  In adela brace  External fixator in place, wounds healing, no drainage  Compartments soft and compressible  Able to wiggle toes  SILT L2-S1  2+ DP

## 2020-05-21 NOTE — DIETITIAN INITIAL EVALUATION ADULT. - PERTINENT MEDS FT
MEDICATIONS  (STANDING):  lactated ringers. 1000 milliLiter(s) (75 mL/Hr) IV Continuous <Continuous>  multivitamin 1 Tablet(s) Oral daily  oxyCODONE  ER Tablet 10 milliGRAM(s) Oral every 12 hours  pantoprazole    Tablet 40 milliGRAM(s) Oral before breakfast    MEDICATIONS  (PRN):  acetaminophen   Tablet .. 650 milliGRAM(s) Oral every 4 hours PRN Mild Pain (1 - 3)  aluminum hydroxide/magnesium hydroxide/simethicone Suspension 30 milliLiter(s) Oral every 4 hours PRN Dyspepsia  bisacodyl 5 milliGRAM(s) Oral daily PRN Constipation  ondansetron Injectable 4 milliGRAM(s) IV Push every 6 hours PRN Nausea  oxyCODONE    IR 5 milliGRAM(s) Oral every 4 hours PRN Moderate Pain (4 - 6)  oxyCODONE    IR 10 milliGRAM(s) Oral every 4 hours PRN Severe Pain (7 - 10)  senna 2 Tablet(s) Oral at bedtime PRN Constipation
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
## 2020-05-21 NOTE — BRIEF OPERATIVE NOTE - OPERATION/FINDINGS
Right ankle removal of external fixator with ankle arthroscopy for removal of arthrofibrosis and loose bodies, application of wound vac system to medial right ankle full thickness wound

## 2020-05-21 NOTE — DIETITIAN INITIAL EVALUATION ADULT. - PERTINENT LABORATORY DATA
05-21 Na142 mmol/L Glu 103 mg/dL<H> K+ 4.1 mmol/L Cr  1.31 mg/dL<H> BUN 23 mg/dL Phos n/a   Alb n/a   PAB n/a

## 2020-05-21 NOTE — PROGRESS NOTE ADULT - ASSESSMENT
A/P:  50 yo M for removal of external fixator of Right ankle, with right ankle arthroscopy, with chronic right distal radius fracture:  -plan for OR 5/21 for removal of ex fix  -medical clearance documented  -NPO, IVF while NPO  -hold all chemical dvt ppx  -SCDs/IS  -NWB RLE in external fixator, R knee in adela locked to 40 degrees  -NWB RUE in removable wrist splint  -pain control  -medical management per primary team  -no plan surgery for the distal radius fracture while admitted  -FU with Dr. Haas once discharged, for possible elective osteotomy of healed right distal radius fracture  -discussed with Dr. Hurd, aware and in agreement with above plan

## 2020-05-21 NOTE — PROGRESS NOTE ADULT - SUBJECTIVE AND OBJECTIVE BOX
Pt S/E at bedside, pain controlled, tolerated procedure well.     Vital Signs Last 24 Hrs  T(C): 36.3 (21 May 2020 16:08), Max: 36.9 (21 May 2020 14:39)  T(F): 97.4 (21 May 2020 16:08), Max: 98.5 (21 May 2020 14:39)  HR: 77 (21 May 2020 16:08) (65 - 91)  BP: 117/79 (21 May 2020 16:08) (108/61 - 133/66)  BP(mean): --  RR: 16 (21 May 2020 16:08) (12 - 18)  SpO2: 100% (21 May 2020 16:08) (93% - 100%)    Gen: NAD,    Right Lower Extremity:  Dressing/Splint clean dry intact, wound vac to suction without leak alarm  Wiggling toes  SILT over toes  Brisk CR to toes  Accessible Compartments soft  No calf TTP LLE
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
## 2020-05-21 NOTE — CHART NOTE - NSCHARTNOTEFT_GEN_A_CORE
Upon Nutritional Assessment by the Registered Dietitian your patient was determined to meet criteria / has evidence of the following diagnosis/diagnoses:          [ ]  Mild Protein Calorie Malnutrition        [ ]  Moderate Protein Calorie Malnutrition        [X] Severe Protein Calorie Malnutrition        [ ] Unspecified Protein Calorie Malnutrition        [ ] Underweight / BMI <19        [ ] Morbid Obesity / BMI > 40      Findings as based on:  •  Comprehensive nutrition assessment and consultation  •  Calorie counts (nutrient intake analysis)  •  Food acceptance and intake status from observations by staff  •  Follow up  •  Patient education  •  Intervention secondary to interdisciplinary rounds  •   concerns      Treatment:    The following diet has been recommended:  Advance diet when medically feasible.  When feasible recommend Ensure TID to optimize PO intake.     PROVIDER Section:     By signing this assessment you are acknowledging and agree with the diagnosis/diagnoses assigned by the Registered Dietitian    Comments:

## 2020-05-21 NOTE — BRIEF OPERATIVE NOTE - NSICDXBRIEFPREOP_GEN_ALL_CORE_FT
PRE-OP DIAGNOSIS:  Wound of right ankle 21-May-2020 15:00:41  Aiden Mora  Aftercare involving removal of fixation device, external 21-May-2020 15:00:09  Aiden Mora  Arthrofibrosis of ankle joint, right 21-May-2020 14:59:35  Aiden Mora

## 2020-05-21 NOTE — DIETITIAN INITIAL EVALUATION ADULT. - ADD RECOMMEND
Advance diet to Regular as medically feasible. Recommend Ensure TID to optimize PO intake and provide an additional 350kcal and 20g protein per serving. Monitor PO intake. Monitor weights. Monitor labs. Recommend Vitamin C, continue MVI daily.
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
## 2020-05-21 NOTE — PROGRESS NOTE ADULT - ASSESSMENT
A/P: 49M s/p R ankle removal of external fixator, ankle arthroscopy and wound vac application POD 0  Pain Control  DVT ppx, pending AC consult  NWB RLE in splint, CAM boot ordered  PT/OT  Incentive Spirometry  Medical management appreciated  Wound Vac and wound care consult with Dr. Broderick, ID consulted for Abx management  DC planning

## 2020-05-21 NOTE — BRIEF OPERATIVE NOTE - NSICDXBRIEFPROCEDURE_GEN_ALL_CORE_FT
PROCEDURES:  Application of wound vacuum assisted closure device to ankle 21-May-2020 14:59:17 Right ankle medial wound Aiden Mora  Arthroscopy of right ankle with synovectomy 21-May-2020 14:59:01  Aiden Mora  Removal of external fixator 21-May-2020 14:58:41 Right Ankle Aiden Mora

## 2020-05-22 ENCOUNTER — TRANSCRIPTION ENCOUNTER (OUTPATIENT)
Age: 50
End: 2020-05-22

## 2020-05-22 LAB
ANION GAP SERPL CALC-SCNC: 7 MMOL/L — SIGNIFICANT CHANGE UP (ref 5–17)
BASOPHILS # BLD AUTO: 0.01 K/UL — SIGNIFICANT CHANGE UP (ref 0–0.2)
BASOPHILS NFR BLD AUTO: 0.1 % — SIGNIFICANT CHANGE UP (ref 0–2)
BUN SERPL-MCNC: 19 MG/DL — SIGNIFICANT CHANGE UP (ref 7–23)
CALCIUM SERPL-MCNC: 9.2 MG/DL — SIGNIFICANT CHANGE UP (ref 8.5–10.1)
CHLORIDE SERPL-SCNC: 110 MMOL/L — HIGH (ref 96–108)
CO2 SERPL-SCNC: 24 MMOL/L — SIGNIFICANT CHANGE UP (ref 22–31)
CREAT SERPL-MCNC: 1.37 MG/DL — HIGH (ref 0.5–1.3)
EOSINOPHIL # BLD AUTO: 0.01 K/UL — SIGNIFICANT CHANGE UP (ref 0–0.5)
EOSINOPHIL NFR BLD AUTO: 0.1 % — SIGNIFICANT CHANGE UP (ref 0–6)
GLUCOSE SERPL-MCNC: 121 MG/DL — HIGH (ref 70–99)
HCT VFR BLD CALC: 33.3 % — LOW (ref 39–50)
HGB BLD-MCNC: 10.3 G/DL — LOW (ref 13–17)
IMM GRANULOCYTES NFR BLD AUTO: 0.1 % — SIGNIFICANT CHANGE UP (ref 0–1.5)
LYMPHOCYTES # BLD AUTO: 0.98 K/UL — LOW (ref 1–3.3)
LYMPHOCYTES # BLD AUTO: 14.2 % — SIGNIFICANT CHANGE UP (ref 13–44)
MCHC RBC-ENTMCNC: 26.7 PG — LOW (ref 27–34)
MCHC RBC-ENTMCNC: 30.9 GM/DL — LOW (ref 32–36)
MCV RBC AUTO: 86.3 FL — SIGNIFICANT CHANGE UP (ref 80–100)
MONOCYTES # BLD AUTO: 0.59 K/UL — SIGNIFICANT CHANGE UP (ref 0–0.9)
MONOCYTES NFR BLD AUTO: 8.6 % — SIGNIFICANT CHANGE UP (ref 2–14)
NEUTROPHILS # BLD AUTO: 5.29 K/UL — SIGNIFICANT CHANGE UP (ref 1.8–7.4)
NEUTROPHILS NFR BLD AUTO: 76.9 % — SIGNIFICANT CHANGE UP (ref 43–77)
PLATELET # BLD AUTO: 259 K/UL — SIGNIFICANT CHANGE UP (ref 150–400)
POTASSIUM SERPL-MCNC: 4.3 MMOL/L — SIGNIFICANT CHANGE UP (ref 3.5–5.3)
POTASSIUM SERPL-SCNC: 4.3 MMOL/L — SIGNIFICANT CHANGE UP (ref 3.5–5.3)
RBC # BLD: 3.86 M/UL — LOW (ref 4.2–5.8)
RBC # FLD: 14.6 % — HIGH (ref 10.3–14.5)
SODIUM SERPL-SCNC: 141 MMOL/L — SIGNIFICANT CHANGE UP (ref 135–145)
WBC # BLD: 6.89 K/UL — SIGNIFICANT CHANGE UP (ref 3.8–10.5)
WBC # FLD AUTO: 6.89 K/UL — SIGNIFICANT CHANGE UP (ref 3.8–10.5)

## 2020-05-22 PROCEDURE — 99232 SBSQ HOSP IP/OBS MODERATE 35: CPT

## 2020-05-22 PROCEDURE — 99233 SBSQ HOSP IP/OBS HIGH 50: CPT

## 2020-05-22 RX ORDER — CHOLECALCIFEROL (VITAMIN D3) 125 MCG
0 CAPSULE ORAL
Qty: 0 | Refills: 0 | DISCHARGE

## 2020-05-22 RX ORDER — VANCOMYCIN HCL 1 G
1000 VIAL (EA) INTRAVENOUS EVERY 12 HOURS
Refills: 0 | Status: DISCONTINUED | OUTPATIENT
Start: 2020-05-22 | End: 2020-05-26

## 2020-05-22 RX ORDER — OMEPRAZOLE 10 MG/1
1 CAPSULE, DELAYED RELEASE ORAL
Qty: 0 | Refills: 0 | DISCHARGE

## 2020-05-22 RX ORDER — ERYTHROPOIETIN 10000 [IU]/ML
0 INJECTION, SOLUTION INTRAVENOUS; SUBCUTANEOUS
Qty: 0 | Refills: 0 | DISCHARGE

## 2020-05-22 RX ORDER — CEFEPIME 1 G/1
2000 INJECTION, POWDER, FOR SOLUTION INTRAMUSCULAR; INTRAVENOUS EVERY 12 HOURS
Refills: 0 | Status: DISCONTINUED | OUTPATIENT
Start: 2020-05-22 | End: 2020-05-26

## 2020-05-22 RX ORDER — ENOXAPARIN SODIUM 100 MG/ML
40 INJECTION SUBCUTANEOUS
Qty: 0 | Refills: 0 | DISCHARGE
Start: 2020-05-22

## 2020-05-22 RX ORDER — OXYCODONE HYDROCHLORIDE 5 MG/1
1 TABLET ORAL
Qty: 0 | Refills: 0 | DISCHARGE

## 2020-05-22 RX ORDER — FERROUS SULFATE 325(65) MG
1 TABLET ORAL
Qty: 0 | Refills: 0 | DISCHARGE

## 2020-05-22 RX ADMIN — Medication 325 MILLIGRAM(S): at 09:38

## 2020-05-22 RX ADMIN — HYDROMORPHONE HYDROCHLORIDE 0.5 MILLIGRAM(S): 2 INJECTION INTRAMUSCULAR; INTRAVENOUS; SUBCUTANEOUS at 23:15

## 2020-05-22 RX ADMIN — Medication 1 TABLET(S): at 12:16

## 2020-05-22 RX ADMIN — Medication 500 MILLIGRAM(S): at 06:03

## 2020-05-22 RX ADMIN — Medication 100 MILLIGRAM(S): at 06:03

## 2020-05-22 RX ADMIN — Medication 325 MILLIGRAM(S): at 17:18

## 2020-05-22 RX ADMIN — HYDROMORPHONE HYDROCHLORIDE 0.5 MILLIGRAM(S): 2 INJECTION INTRAMUSCULAR; INTRAVENOUS; SUBCUTANEOUS at 17:17

## 2020-05-22 RX ADMIN — Medication 325 MILLIGRAM(S): at 12:16

## 2020-05-22 RX ADMIN — Medication 250 MILLIGRAM(S): at 17:18

## 2020-05-22 RX ADMIN — OXYCODONE HYDROCHLORIDE 10 MILLIGRAM(S): 5 TABLET ORAL at 06:05

## 2020-05-22 RX ADMIN — OXYCODONE HYDROCHLORIDE 10 MILLIGRAM(S): 5 TABLET ORAL at 09:41

## 2020-05-22 RX ADMIN — CEFEPIME 100 MILLIGRAM(S): 1 INJECTION, POWDER, FOR SOLUTION INTRAMUSCULAR; INTRAVENOUS at 18:29

## 2020-05-22 RX ADMIN — ENOXAPARIN SODIUM 40 MILLIGRAM(S): 100 INJECTION SUBCUTANEOUS at 06:03

## 2020-05-22 RX ADMIN — Medication 1 MILLIGRAM(S): at 12:16

## 2020-05-22 RX ADMIN — HYDROMORPHONE HYDROCHLORIDE 0.5 MILLIGRAM(S): 2 INJECTION INTRAMUSCULAR; INTRAVENOUS; SUBCUTANEOUS at 03:59

## 2020-05-22 RX ADMIN — Medication 500 MILLIGRAM(S): at 17:18

## 2020-05-22 NOTE — PROGRESS NOTE ADULT - SUBJECTIVE AND OBJECTIVE BOX
I applied a xlarge cam boot to Cj's rt foot after removing plaster splint ,but leaving webril wrap intact. Also provided two socks and darco shoe for left foot.  Printed instructions were given pt. regarding camboot .   [place] [phone]

## 2020-05-22 NOTE — DISCHARGE NOTE PROVIDER - NSDCCPCAREPLAN_GEN_ALL_CORE_FT
PRINCIPAL DISCHARGE DIAGNOSIS  Diagnosis: Ankle fracture  Assessment and Plan of Treatment: PRINCIPAL DISCHARGE DIAGNOSIS  Diagnosis: Ankle fracture  Assessment and Plan of Treatment: healing.   non weight bearing right lower extremity  wound vac changes monday/wednesday/friday  follow up with Dr. Hurd as advised.

## 2020-05-22 NOTE — PROGRESS NOTE ADULT - SUBJECTIVE AND OBJECTIVE BOX
Pt S/E at bedside, no acute events overnight, pain controlled    Vital Signs Last 24 Hrs  T(C): 36.3 (21 May 2020 16:08), Max: 36.9 (21 May 2020 14:39)  T(F): 97.4 (21 May 2020 16:08), Max: 98.5 (21 May 2020 14:39)  HR: 77 (21 May 2020 16:08) (65 - 91)  BP: 117/79 (21 May 2020 16:08) (108/61 - 133/66)  BP(mean): --  RR: 16 (21 May 2020 16:08) (12 - 18)  SpO2: 100% (21 May 2020 16:08) (93% - 100%)    Gen: NAD,    Right Lower Extremity:  Dressing/Splint clean dry intact, wound vac to suction without leak alarm  Wiggling toes  SILT over toes  Brisk CR to toes  Accessible Compartments soft  No calf TTP LLE

## 2020-05-22 NOTE — PHYSICAL THERAPY INITIAL EVALUATION ADULT - GENERAL OBSERVATIONS, REHAB EVAL
Pt was seen on 2N, NAD, supine in bed, R ankle ace+, drain+, L ankle lateral healing wound at [facility], Dr Bynum in room and viewed this w/ instructions for pt, R wrist removable brace+, pain 4/10 R ankle , 0/10 R wrist, awaiting CAM BOOT, R knee brace+

## 2020-05-22 NOTE — DISCHARGE NOTE PROVIDER - NSDCMRMEDTOKEN_GEN_ALL_CORE_FT
enoxaparin 40 mg/0.4 mL injectable solution: 40 milligram(s) injectable once a day enoxaparin 40 mg/0.4 mL injectable solution: 40 milligram(s) subcutaneously once a day   enoxaparin 40 mg/0.4 mL injectable solution: 40 milligram(s) injectable once a day enoxaparin 40 mg/0.4 mL injectable solution: 40 milligram(s) injectable once a day  MiraLax oral powder for reconstitution: 17 gram(s) orally once a day cefepime 2 g intravenous injection: 2 gram(s) intravenous every 12 hours  enoxaparin 40 mg/0.4 mL injectable solution: 40 milligram(s) injectable once a day MDD:40mg as directed by anticoagulation services  MiraLax oral powder for reconstitution: 17 gram(s) orally once a day  vancomycin 1 g intravenous injection: 1 gram(s) intravenous every 12 hours cefepime 2 g intravenous injection: 2 gram(s) intravenous every 12 hours  enoxaparin 40 mg/0.4 mL injectable solution: 40 milligram(s) injectable once a day MDD:40mg as directed by anticoagulation services  MiraLax oral powder for reconstitution: 17 gram(s) orally once a day  oxycodone-acetaminophen 5 mg-325 mg oral tablet: 1 tab(s) orally every 6 hours, As Needed -for severe pain MDD:4   vancomycin 1 g intravenous injection: 1 gram(s) intravenous every 12 hours

## 2020-05-22 NOTE — DISCHARGE NOTE PROVIDER - PROVIDER TOKENS
PROVIDER:[TOKEN:[[id number]]] PROVIDER:[TOKEN:[[id number]]],PROVIDER:[TOKEN:[[id number]]] PROVIDER:[TOKEN:[[id number]]],PROVIDER:[TOKEN:[[id number]]],PROVIDER:[TOKEN:[[id number]]]

## 2020-05-22 NOTE — DISCHARGE NOTE PROVIDER - CARE PROVIDERS DIRECT ADDRESSES
,[email or web address],DirectAddress_Unknown [email or web address],DirectAddress_Unknown,DirectAddress_Unknown

## 2020-05-22 NOTE — PROGRESS NOTE ADULT - ASSESSMENT
A/P: 49M s/p R ankle removal of external fixator, ankle arthroscopy and wound vac application POD 1  Pain Control  DVT ppx, pending AC consult  NWB RLE in splint, CAM boot ordered  PT/OT  Incentive Spirometry  Medical management appreciated  Wound Vac and wound care consult with Dr. Broderick, ID consulted for Abx management  DC planning
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
## 2020-05-22 NOTE — DISCHARGE NOTE PROVIDER - NSDCCPTREATMENT_GEN_ALL_CORE_FT
PRINCIPAL PROCEDURE  Procedure: Removal of external fixator  Findings and Treatment: Right Ankle      SECONDARY PROCEDURE  Procedure: Arthroscopy of right ankle with synovectomy  Findings and Treatment:
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
## 2020-05-22 NOTE — DISCHARGE NOTE PROVIDER - NSDCFUADDINST_GEN_ALL_CORE_FT
IM Nail DC Instructions:    1.	Resume previous diet, regular or diabetic as appropriate  2.	Non weigh bearing right lower extremity in CAM Boot with assistance and rolling walker  3.	Continue DVT/PE Prophylaxis. See Med Rec for Duration and dose.  4.	PT daily  5.	Follow up with Orthopedic Surgeon Dr. Hurd in 14 Days. Call Office For Appointment.  6.	Staples to be removed by RN at rehab Post-Op Day 14 (6/5), provided wound is healed, no open areas drainage.  7.	Ice the hip as much as possible  8.	Wound vac placed by plastics, TESSA with Dr. Broderick outpatient for management of wound  9.     Keep dressing clean/dry when showering DC Instructions:    1.	Resume previous diet, regular or diabetic as appropriate  2.	Non weigh bearing right lower extremity in CAM Boot with assistance and rolling walker  3.	Continue DVT/PE Prophylaxis. See Med Rec for Duration and dose.  4.	PT daily  5.	Follow up with Orthopedic Surgeon Dr. Hurd in 14 Days. Call Office For Appointment.  6.	Staples to be removed by RN at rehab Post-Op Day 14 (6/5), provided wound is healed, no open areas drainage.  7.	Ice the hip as much as possible  8.	Wound vac placed by plastics, TESSA with Dr. Broderick outpatient for management of wound this week  9.     Keep dressing clean/dry when showering  10.	Plan for home with wound vac, home nursing to change Mon, Wed and Fri DC Instructions:    1.	Resume previous diet, regular or diabetic as appropriate  2.	Non weight bearing right lower extremity in CAM Boot with assistance and rolling walker  3.	Continue DVT/PE Prophylaxis. See Med Rec for Duration and dose. Lovenox daily.  4.	PT daily  5.	Follow up with Orthopedic Surgeon Dr. Hurd in 14 Days. Call Office For Appointment.  6.	Staples to be removed by RN at rehab Post-Op Day 14 (6/5), provided wound is healed, no open areas drainage.  7.	Ice the hip as much as possible  8.	Wound vac placed by plastics, TESSA with Dr. Broderick outpatient for management of wound this week  9.     Keep dressing clean/dry when showering  10.	Plan for home with wound vac, home nursing to change Mon, Wed and Fri DC Instructions:    1.	Resume previous diet, regular or diabetic as appropriate  2.	Non weight bearing right lower extremity in CAM Boot with assistance and rolling walker  3.	Continue DVT/PE Prophylaxis. See Med Rec for Duration and dose. Lovenox daily.  4.	PT daily  5.	Follow up with Orthopedic Surgeon Dr. Hurd in 14 Days. Call Office For Appointment.  6.	Staples to be removed by RN at rehab Post-Op Day 14 (6/5), provided wound is healed, no open areas drainage.  7.	Ice the hip as much as possible  8.	Wound vac placed by plastics, TESSA with Dr. Broderick outpatient for management of wound this week  9.     Keep dressing clean/dry when showering  10.	Plan for home with wound vac, home nursing to change Mon, Wed and Fri  11. Continue oral antibiotics per infectious disease DC Instructions:    1.	Resume previous diet, regular or diabetic as appropriate  2.	Non weight bearing right lower extremity in CAM Boot with assistance and rolling walker  3.	Continue DVT/PE Prophylaxis. See Med Rec for Duration and dose. Lovenox daily.  4.	PT daily  5.	Follow up with Orthopedic Surgeon Dr. Hurd in 14 Days. Call Office For Appointment.  6.	Staples to be removed by RN at rehab Post-Op Day 14 (6/5), provided wound is healed, no open areas drainage.  7.	Ice the hip as much as possible  8.	Wound vac placed by plastics, FU with Dr. Broderick outpatient for management of wound this week  9.     Keep dressing clean/dry when showering  10.	Plan for home with wound vac, home nursing to change Mon, Wed and Fri  11. Continue oral antibiotics per infectious disease  12. Follow up with Dr. Haas for the right wrist malunion for possible elective procedure

## 2020-05-22 NOTE — DISCHARGE NOTE PROVIDER - CARE PROVIDER_API CALL
Lucio Hurd  ORTHOPAEDIC SURGERY  [address] [place] [address]  Phone: [phone]  Fax: [phone]  Follow Up Time: Lucio Hurd  ORTHOPAEDIC SURGERY  [address] [place] [address]  Phone: [phone]  Fax: [phone]  Follow Up Time:     Leigha Broderick  PLASTIC SURGERY  [address] [facility] [address]  [place] [address]  Phone: [phone]  Fax: [phone]  Follow Up Time: Lucio Hurd  ORTHOPAEDIC SURGERY  [address] [place] [address]  Phone: [phone]  Fax: [phone]  Follow Up Time:     Leigha Broderick  PLASTIC SURGERY  [address] [facility]  [address]  [place] [address]  Phone: [phone]  Fax: [phone]  Follow Up Time:     Paty Haas  ORTHOPAEDIC SURGERY  [address] [place] [address]  Phone: [phone]  Fax: [phone]  Follow Up Time:
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
## 2020-05-22 NOTE — CONSULT NOTE ADULT - REASON FOR ADMISSION
RT ankle ex-fix removal
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
## 2020-05-22 NOTE — PHYSICAL THERAPY INITIAL EVALUATION ADULT - PERTINENT HX OF CURRENT PROBLEM, REHAB EVAL
49M s/p R ankle removal of external fixator, ankle arthroscopy and wound vac application POD 1, distal radius fracture
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
## 2020-05-22 NOTE — PHYSICAL THERAPY INITIAL EVALUATION ADULT - MODALITIES TREATMENT COMMENTS
Pt OOB in chair, NAD, R UE on pillow,  R LE on 2 pillows on a stool elevated, +Drain, 4/10 pain, RN Sabrina gave meds, CBIR, tray table in front, +alarm,
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
## 2020-05-22 NOTE — PHYSICAL THERAPY INITIAL EVALUATION ADULT - DIAGNOSIS, PT EVAL
s/p R ankle removal of external fixator, ankle arthroscopy and wound vac application POD 1, distal radius fracture

## 2020-05-23 LAB
ANION GAP SERPL CALC-SCNC: 7 MMOL/L — SIGNIFICANT CHANGE UP (ref 5–17)
BUN SERPL-MCNC: 18 MG/DL — SIGNIFICANT CHANGE UP (ref 7–23)
CALCIUM SERPL-MCNC: 9.2 MG/DL — SIGNIFICANT CHANGE UP (ref 8.5–10.1)
CHLORIDE SERPL-SCNC: 109 MMOL/L — HIGH (ref 96–108)
CO2 SERPL-SCNC: 25 MMOL/L — SIGNIFICANT CHANGE UP (ref 22–31)
CREAT SERPL-MCNC: 1.15 MG/DL — SIGNIFICANT CHANGE UP (ref 0.5–1.3)
GLUCOSE SERPL-MCNC: 127 MG/DL — HIGH (ref 70–99)
HCT VFR BLD CALC: 33 % — LOW (ref 39–50)
HGB BLD-MCNC: 10.2 G/DL — LOW (ref 13–17)
MCHC RBC-ENTMCNC: 26.7 PG — LOW (ref 27–34)
MCHC RBC-ENTMCNC: 30.9 GM/DL — LOW (ref 32–36)
MCV RBC AUTO: 86.4 FL — SIGNIFICANT CHANGE UP (ref 80–100)
PLATELET # BLD AUTO: 232 K/UL — SIGNIFICANT CHANGE UP (ref 150–400)
POTASSIUM SERPL-MCNC: 3.9 MMOL/L — SIGNIFICANT CHANGE UP (ref 3.5–5.3)
POTASSIUM SERPL-SCNC: 3.9 MMOL/L — SIGNIFICANT CHANGE UP (ref 3.5–5.3)
RBC # BLD: 3.82 M/UL — LOW (ref 4.2–5.8)
RBC # FLD: 14.7 % — HIGH (ref 10.3–14.5)
SODIUM SERPL-SCNC: 141 MMOL/L — SIGNIFICANT CHANGE UP (ref 135–145)
WBC # BLD: 6.22 K/UL — SIGNIFICANT CHANGE UP (ref 3.8–10.5)
WBC # FLD AUTO: 6.22 K/UL — SIGNIFICANT CHANGE UP (ref 3.8–10.5)

## 2020-05-23 PROCEDURE — 99232 SBSQ HOSP IP/OBS MODERATE 35: CPT

## 2020-05-23 PROCEDURE — 99233 SBSQ HOSP IP/OBS HIGH 50: CPT

## 2020-05-23 RX ADMIN — ENOXAPARIN SODIUM 40 MILLIGRAM(S): 100 INJECTION SUBCUTANEOUS at 06:07

## 2020-05-23 RX ADMIN — Medication 250 MILLIGRAM(S): at 17:57

## 2020-05-23 RX ADMIN — Medication 325 MILLIGRAM(S): at 08:47

## 2020-05-23 RX ADMIN — HYDROMORPHONE HYDROCHLORIDE 0.5 MILLIGRAM(S): 2 INJECTION INTRAMUSCULAR; INTRAVENOUS; SUBCUTANEOUS at 05:27

## 2020-05-23 RX ADMIN — Medication 325 MILLIGRAM(S): at 13:22

## 2020-05-23 RX ADMIN — Medication 500 MILLIGRAM(S): at 17:57

## 2020-05-23 RX ADMIN — Medication 1 MILLIGRAM(S): at 11:18

## 2020-05-23 RX ADMIN — Medication 250 MILLIGRAM(S): at 06:07

## 2020-05-23 RX ADMIN — CEFEPIME 100 MILLIGRAM(S): 1 INJECTION, POWDER, FOR SOLUTION INTRAMUSCULAR; INTRAVENOUS at 05:27

## 2020-05-23 RX ADMIN — CEFEPIME 100 MILLIGRAM(S): 1 INJECTION, POWDER, FOR SOLUTION INTRAMUSCULAR; INTRAVENOUS at 17:57

## 2020-05-23 RX ADMIN — OXYCODONE HYDROCHLORIDE 10 MILLIGRAM(S): 5 TABLET ORAL at 11:16

## 2020-05-23 RX ADMIN — OXYCODONE HYDROCHLORIDE 5 MILLIGRAM(S): 5 TABLET ORAL at 18:02

## 2020-05-23 RX ADMIN — Medication 500 MILLIGRAM(S): at 06:07

## 2020-05-23 RX ADMIN — Medication 325 MILLIGRAM(S): at 17:57

## 2020-05-23 RX ADMIN — Medication 1 TABLET(S): at 11:18

## 2020-05-23 RX ADMIN — OXYCODONE HYDROCHLORIDE 10 MILLIGRAM(S): 5 TABLET ORAL at 22:07

## 2020-05-23 NOTE — PROGRESS NOTE ADULT - SUBJECTIVE AND OBJECTIVE BOX
Date of service: 05-23-20 @ 12:39    Lying in bed in NAD  Has right ankle tenderness  Ankle is imobilized    ROS: no fever or chills; denies dizziness, no HA, no SOB or cough, no abdominal pain, no diarrhea or constipation; no dysuria    MEDICATIONS  (STANDING):  ascorbic acid 500 milliGRAM(s) Oral two times a day  cefepime   IVPB 2000 milliGRAM(s) IV Intermittent every 12 hours  enoxaparin Injectable 40 milliGRAM(s) SubCutaneous every 24 hours  ferrous    sulfate 325 milliGRAM(s) Oral three times a day with meals  folic acid 1 milliGRAM(s) Oral daily  multivitamin 1 Tablet(s) Oral daily  sodium chloride 0.9%. 1000 milliLiter(s) (100 mL/Hr) IV Continuous <Continuous>  vancomycin  IVPB 1000 milliGRAM(s) IV Intermittent every 12 hours      Vital Signs Last 24 Hrs  T(C): 37.1 (23 May 2020 11:09), Max: 37.1 (22 May 2020 23:19)  T(F): 98.7 (23 May 2020 11:09), Max: 98.7 (22 May 2020 23:19)  HR: 84 (23 May 2020 11:09) (84 - 88)  BP: 120/65 (23 May 2020 11:09) (113/70 - 122/69)  BP(mean): --  RR: 16 (23 May 2020 11:09) (16 - 16)  SpO2: 100% (23 May 2020 11:09) (100% - 100%)        Physical exam:    Constitutional:  No acute distress  HEENT: NC/AT, EOMI, PERRLA, conjunctivae clear  Neck: supple; thyroid not palpable  Back: no tenderness  Respiratory: respiratory effort normal; clear to auscultation  Cardiovascular: S1S2 regular, no murmurs  Abdomen: soft, not tender, not distended, positive BS  Genitourinary: no suprapubic tenderness  Lymphatic: no LN palpable  Musculoskeletal: no muscle tenderness, no joint swelling or tenderness; right ankle tenderness  Extremities: no pedal edema  Right medial ankle open wound with VAC in place  Neurological/ Psychiatric: AxOx3, moving all extremities  Skin: no rashes; no palpable lesions    Labs: all available labs reviewed                        10.3   6.89  )-----------( 259      ( 22 May 2020 06:47 )             33.3     05-22    141  |  110<H>  |  19  ----------------------------<  121<H>  4.3   |  24  |  1.37<H>    Ca    9.2      22 May 2020 06:47    TPro  8.3  /  Alb  3.7  /  TBili  0.9  /  DBili  x   /  AST  28  /  ALT  53  /  AlkPhos  325<H>  05-20     LIVER FUNCTIONS - ( 20 May 2020 11:54 )  Alb: 3.7 g/dL / Pro: 8.3 gm/dL / ALK PHOS: 325 U/L / ALT: 53 U/L / AST: 28 U/L / GGT: x                 COVID-19 PCR: Marcietegisselle (05-20-20 @ 11:54)    Radiology: all available radiological tests reviewed    Advanced directives addressed: full resuscitation
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
## 2020-05-23 NOTE — PROGRESS NOTE ADULT - ASSESSMENT
A/P: 49M s/p R ankle removal of external fixator, ankle arthroscopy and wound vac application POD 2  Pain Control  DVT ppx, lovenox per AC  NWB RLE in splint, CAM boot ordered  PT/OT  Incentive Spirometry  Medical management appreciated  Wound Vac and wound care consult with Dr. Broderick, ID consulted for Abx management  Per plastics, will reevaluate on Sunday 5/24 and make plan  On cefepime and vancomycin per ID  DC planning

## 2020-05-23 NOTE — PROGRESS NOTE ADULT - SUBJECTIVE AND OBJECTIVE BOX
Pt S/E at bedside, no acute events overnight, pain controlled    Vital Signs Last 24 Hrs  T(C): 37.1 (22 May 2020 23:19), Max: 37.1 (22 May 2020 23:19)  T(F): 98.7 (22 May 2020 23:19), Max: 98.7 (22 May 2020 23:19)  HR: 88 (22 May 2020 23:19) (88 - 90)  BP: 122/69 (22 May 2020 23:19) (113/70 - 122/69)  BP(mean): 76 (22 May 2020 11:19) (76 - 76)  RR: 16 (22 May 2020 23:19) (15 - 16)  SpO2: 100% (22 May 2020 23:19) (99% - 100%)  Gen: NAD,    Right Lower Extremity:  Dressing/Splint clean dry intact, wound vac to suction without leak alarm  CAM Boot at bedside  Wiggling toes  SILT over toes  Brisk CR to toes  Accessible Compartments soft  No calf TTP LLE

## 2020-05-24 LAB
ANION GAP SERPL CALC-SCNC: 7 MMOL/L — SIGNIFICANT CHANGE UP (ref 5–17)
BUN SERPL-MCNC: 17 MG/DL — SIGNIFICANT CHANGE UP (ref 7–23)
CALCIUM SERPL-MCNC: 9.3 MG/DL — SIGNIFICANT CHANGE UP (ref 8.5–10.1)
CHLORIDE SERPL-SCNC: 107 MMOL/L — SIGNIFICANT CHANGE UP (ref 96–108)
CO2 SERPL-SCNC: 24 MMOL/L — SIGNIFICANT CHANGE UP (ref 22–31)
CREAT SERPL-MCNC: 1.12 MG/DL — SIGNIFICANT CHANGE UP (ref 0.5–1.3)
GLUCOSE SERPL-MCNC: 104 MG/DL — HIGH (ref 70–99)
HCT VFR BLD CALC: 33.2 % — LOW (ref 39–50)
HGB BLD-MCNC: 10.6 G/DL — LOW (ref 13–17)
MCHC RBC-ENTMCNC: 27.2 PG — SIGNIFICANT CHANGE UP (ref 27–34)
MCHC RBC-ENTMCNC: 31.9 GM/DL — LOW (ref 32–36)
MCV RBC AUTO: 85.3 FL — SIGNIFICANT CHANGE UP (ref 80–100)
PLATELET # BLD AUTO: 248 K/UL — SIGNIFICANT CHANGE UP (ref 150–400)
POTASSIUM SERPL-MCNC: 3.9 MMOL/L — SIGNIFICANT CHANGE UP (ref 3.5–5.3)
POTASSIUM SERPL-SCNC: 3.9 MMOL/L — SIGNIFICANT CHANGE UP (ref 3.5–5.3)
RBC # BLD: 3.89 M/UL — LOW (ref 4.2–5.8)
RBC # FLD: 14.6 % — HIGH (ref 10.3–14.5)
SODIUM SERPL-SCNC: 138 MMOL/L — SIGNIFICANT CHANGE UP (ref 135–145)
VANCOMYCIN TROUGH SERPL-MCNC: 13.2 UG/ML — SIGNIFICANT CHANGE UP (ref 10–20)
WBC # BLD: 5.87 K/UL — SIGNIFICANT CHANGE UP (ref 3.8–10.5)
WBC # FLD AUTO: 5.87 K/UL — SIGNIFICANT CHANGE UP (ref 3.8–10.5)

## 2020-05-24 PROCEDURE — 99233 SBSQ HOSP IP/OBS HIGH 50: CPT

## 2020-05-24 PROCEDURE — 99232 SBSQ HOSP IP/OBS MODERATE 35: CPT

## 2020-05-24 RX ADMIN — MAGNESIUM HYDROXIDE 30 MILLILITER(S): 400 TABLET, CHEWABLE ORAL at 19:49

## 2020-05-24 RX ADMIN — Medication 500 MILLIGRAM(S): at 17:18

## 2020-05-24 RX ADMIN — Medication 325 MILLIGRAM(S): at 09:03

## 2020-05-24 RX ADMIN — OXYCODONE HYDROCHLORIDE 10 MILLIGRAM(S): 5 TABLET ORAL at 22:40

## 2020-05-24 RX ADMIN — Medication 250 MILLIGRAM(S): at 17:20

## 2020-05-24 RX ADMIN — Medication 325 MILLIGRAM(S): at 17:18

## 2020-05-24 RX ADMIN — CEFEPIME 100 MILLIGRAM(S): 1 INJECTION, POWDER, FOR SOLUTION INTRAMUSCULAR; INTRAVENOUS at 17:20

## 2020-05-24 RX ADMIN — Medication 325 MILLIGRAM(S): at 12:29

## 2020-05-24 RX ADMIN — CEFEPIME 100 MILLIGRAM(S): 1 INJECTION, POWDER, FOR SOLUTION INTRAMUSCULAR; INTRAVENOUS at 05:43

## 2020-05-24 RX ADMIN — ENOXAPARIN SODIUM 40 MILLIGRAM(S): 100 INJECTION SUBCUTANEOUS at 05:44

## 2020-05-24 RX ADMIN — OXYCODONE HYDROCHLORIDE 10 MILLIGRAM(S): 5 TABLET ORAL at 12:30

## 2020-05-24 RX ADMIN — Medication 500 MILLIGRAM(S): at 05:43

## 2020-05-24 RX ADMIN — OXYCODONE HYDROCHLORIDE 5 MILLIGRAM(S): 5 TABLET ORAL at 17:18

## 2020-05-24 RX ADMIN — Medication 1 MILLIGRAM(S): at 12:30

## 2020-05-24 RX ADMIN — OXYCODONE HYDROCHLORIDE 5 MILLIGRAM(S): 5 TABLET ORAL at 05:43

## 2020-05-24 RX ADMIN — Medication 250 MILLIGRAM(S): at 06:26

## 2020-05-24 RX ADMIN — Medication 1 TABLET(S): at 12:29
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
## 2020-05-24 NOTE — PROGRESS NOTE ADULT - SUBJECTIVE AND OBJECTIVE BOX
Pt S/E at bedside, no acute events overnight, pain controlled    Vital Signs Last 24 Hrs  T(C): 36.7 (23 May 2020 17:12), Max: 37.1 (23 May 2020 11:09)  T(F): 98 (23 May 2020 17:12), Max: 98.7 (23 May 2020 11:09)  HR: 91 (23 May 2020 22:05) (84 - 91)  BP: 131/84 (23 May 2020 22:05) (108/60 - 131/84)  BP(mean): --  RR: 16 (23 May 2020 17:12) (16 - 16)  SpO2: 100% (23 May 2020 17:12) (100% - 100%)    Gen: NAD,    Right Lower Extremity:  Dressing/Splint clean dry intact, wound vac to suction without leak alarm  CAM Boot at bedside  Wiggling toes  SILT over toes  Brisk CR to toes  Accessible Compartments soft  No calf TTP LLE

## 2020-05-24 NOTE — PROGRESS NOTE ADULT - ASSESSMENT
A/P: 49M s/p R ankle removal of external fixator, ankle arthroscopy and wound vac application POD 3  Pain Control  DVT ppx, lovenox per AC  NWB RLE in  CAM boot   PT/OT  Incentive Spirometry  Medical management appreciated  Wound Vac and wound care consult with Dr. Broderick, ID consulted for Abx management  Per plastics, will reevaluate on Sunday 5/24 and make plan  On cefepime and vancomycin per ID  DC planning

## 2020-05-24 NOTE — PROGRESS NOTE ADULT - SUBJECTIVE AND OBJECTIVE BOX
Date of service: 05-24-20 @ 09:42    Lying in bed in NAD  Right ankle mild tenderness  Weak looking    ROS: no fever or chills; denies dizziness, no HA, no SOB or cough, no abdominal pain, no diarrhea or constipation; no dysuria, no legs pain, no rashes    MEDICATIONS  (STANDING):  ascorbic acid 500 milliGRAM(s) Oral two times a day  cefepime   IVPB 2000 milliGRAM(s) IV Intermittent every 12 hours  enoxaparin Injectable 40 milliGRAM(s) SubCutaneous every 24 hours  ferrous    sulfate 325 milliGRAM(s) Oral three times a day with meals  folic acid 1 milliGRAM(s) Oral daily  multivitamin 1 Tablet(s) Oral daily  sodium chloride 0.9%. 1000 milliLiter(s) (100 mL/Hr) IV Continuous <Continuous>  vancomycin  IVPB 1000 milliGRAM(s) IV Intermittent every 12 hours      Vital Signs Last 24 Hrs  T(C): 36.7 (23 May 2020 17:12), Max: 37.1 (23 May 2020 11:09)  T(F): 98 (23 May 2020 17:12), Max: 98.7 (23 May 2020 11:09)  HR: 91 (23 May 2020 22:05) (84 - 91)  BP: 131/84 (23 May 2020 22:05) (108/60 - 131/84)  BP(mean): --  RR: 16 (23 May 2020 17:12) (16 - 16)  SpO2: 100% (23 May 2020 17:12) (100% - 100%)    Physical exam:    Constitutional:  No acute distress  HEENT: NC/AT, EOMI, PERRLA, conjunctivae clear  Neck: supple; thyroid not palpable  Back: no tenderness  Respiratory: respiratory effort normal; clear to auscultation  Cardiovascular: S1S2 regular, no murmurs  Abdomen: soft, not tender, not distended, positive BS  Genitourinary: no suprapubic tenderness  Lymphatic: no LN palpable  Musculoskeletal: no muscle tenderness, no joint swelling or tenderness; right ankle tenderness  Extremities: no pedal edema  Right medial ankle open wound with VAC in place  Neurological/ Psychiatric: AxOx3, moving all extremities  Skin: no rashes; no palpable lesions    Labs: reviewed                        10.6   5.87  )-----------( 248      ( 24 May 2020 05:28 )             33.2     05-24    138  |  107  |  17  ----------------------------<  104<H>  3.9   |  24  |  1.12    Ca    9.3      24 May 2020 05:28    Vancomycin Level, Trough: 13.2 ug/mL (05-24 @ 05:28)                          10.3   6.89  )-----------( 259      ( 22 May 2020 06:47 )             33.3     05-22    141  |  110<H>  |  19  ----------------------------<  121<H>  4.3   |  24  |  1.37<H>    Ca    9.2      22 May 2020 06:47    TPro  8.3  /  Alb  3.7  /  TBili  0.9  /  DBili  x   /  AST  28  /  ALT  53  /  AlkPhos  325<H>  05-20     LIVER FUNCTIONS - ( 20 May 2020 11:54 )  Alb: 3.7 g/dL / Pro: 8.3 gm/dL / ALK PHOS: 325 U/L / ALT: 53 U/L / AST: 28 U/L / GGT: x             COVID-19 PCR: NotDetec (05-20-20 @ 11:54)    Radiology: all available radiological tests reviewed    < from: Xray Tibia + Fibula 2 Views, Right (05.20.20 @ 13:45) >  Status post reduction of dislocated talus and comminuted fractures of the lateral malleolus. Ossific fragments of the distal aspect of the medial malleolus.  External fixation device stabilizes the tibia with intramedullary and pins in the vertical external jenna apparatus in place. Postoperative a pin fixation of the proximal first metatarsal and calcaneus. Fracture segments are in proper anatomical alignment.    < end of copied text >      Advanced directives addressed: full resuscitation

## 2020-05-24 NOTE — PROGRESS NOTE ADULT - SUBJECTIVE AND OBJECTIVE BOX
Pt without complaints.     AVSS  no leukocytosis    PE  RLE wound vac taken down: no cellulitis or drainage noted.   wound is 50% pink granulation, no deb necrosis   small holes from ex fix are healing well without erythema or drainage      Impression: 50M with R ankle wound. Doing well with wound vac.  Does not need another debridement at this time.    --Cont negative pressure therapy.  VAC should get changed again on Tuesday; then begin M-W-F schedule as outpatient.   --Pt to follow up with me week of June 1  --please call with any questions   [phone]
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
## 2020-05-25 PROCEDURE — 99233 SBSQ HOSP IP/OBS HIGH 50: CPT

## 2020-05-25 PROCEDURE — 99232 SBSQ HOSP IP/OBS MODERATE 35: CPT

## 2020-05-25 RX ORDER — ENOXAPARIN SODIUM 100 MG/ML
40 INJECTION SUBCUTANEOUS
Qty: 30 | Refills: 1
Start: 2020-05-25 | End: 2020-07-23

## 2020-05-25 RX ADMIN — Medication 325 MILLIGRAM(S): at 17:20

## 2020-05-25 RX ADMIN — Medication 250 MILLIGRAM(S): at 17:20

## 2020-05-25 RX ADMIN — Medication 500 MILLIGRAM(S): at 06:28

## 2020-05-25 RX ADMIN — OXYCODONE HYDROCHLORIDE 5 MILLIGRAM(S): 5 TABLET ORAL at 12:14

## 2020-05-25 RX ADMIN — OXYCODONE HYDROCHLORIDE 5 MILLIGRAM(S): 5 TABLET ORAL at 06:28

## 2020-05-25 RX ADMIN — MAGNESIUM HYDROXIDE 30 MILLILITER(S): 400 TABLET, CHEWABLE ORAL at 06:28

## 2020-05-25 RX ADMIN — ENOXAPARIN SODIUM 40 MILLIGRAM(S): 100 INJECTION SUBCUTANEOUS at 06:29

## 2020-05-25 RX ADMIN — Medication 1 TABLET(S): at 12:14

## 2020-05-25 RX ADMIN — Medication 325 MILLIGRAM(S): at 12:14

## 2020-05-25 RX ADMIN — Medication 250 MILLIGRAM(S): at 06:58

## 2020-05-25 RX ADMIN — CEFEPIME 100 MILLIGRAM(S): 1 INJECTION, POWDER, FOR SOLUTION INTRAMUSCULAR; INTRAVENOUS at 17:20

## 2020-05-25 RX ADMIN — Medication 500 MILLIGRAM(S): at 17:19

## 2020-05-25 RX ADMIN — OXYCODONE HYDROCHLORIDE 5 MILLIGRAM(S): 5 TABLET ORAL at 17:20

## 2020-05-25 RX ADMIN — Medication 325 MILLIGRAM(S): at 08:37

## 2020-05-25 RX ADMIN — Medication 1 MILLIGRAM(S): at 12:14

## 2020-05-25 RX ADMIN — CEFEPIME 100 MILLIGRAM(S): 1 INJECTION, POWDER, FOR SOLUTION INTRAMUSCULAR; INTRAVENOUS at 06:24

## 2020-05-25 RX ADMIN — OXYCODONE HYDROCHLORIDE 10 MILLIGRAM(S): 5 TABLET ORAL at 22:29
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
## 2020-05-25 NOTE — PROGRESS NOTE ADULT - SUBJECTIVE AND OBJECTIVE BOX
Date of service: 05-25-20 @ 09:38    Lying in bed in NAD  Right ankle wound with VAC  Right ankle pain improving  Has low grade fever    ROS: denies dizziness, no HA, no SOB or cough, no abdominal pain, no diarrhea or constipation; no dysuria    MEDICATIONS  (STANDING):  ascorbic acid 500 milliGRAM(s) Oral two times a day  cefepime   IVPB 2000 milliGRAM(s) IV Intermittent every 12 hours  enoxaparin Injectable 40 milliGRAM(s) SubCutaneous every 24 hours  ferrous    sulfate 325 milliGRAM(s) Oral three times a day with meals  folic acid 1 milliGRAM(s) Oral daily  multivitamin 1 Tablet(s) Oral daily  sodium chloride 0.9%. 1000 milliLiter(s) (100 mL/Hr) IV Continuous <Continuous>  vancomycin  IVPB 1000 milliGRAM(s) IV Intermittent every 12 hours      Vital Signs Last 24 Hrs  T(C): 37.4 (24 May 2020 22:38), Max: 37.4 (24 May 2020 22:38)  T(F): 99.3 (24 May 2020 22:38), Max: 99.3 (24 May 2020 22:38)  HR: 89 (24 May 2020 22:38) (89 - 91)  BP: 127/75 (24 May 2020 22:38) (109/58 - 127/75)  BP(mean): --  RR: 16 (24 May 2020 22:38) (16 - 16)  SpO2: 100% (24 May 2020 22:38) (100% - 100%)        Physical exam:    Constitutional:  No acute distress  HEENT: NC/AT, EOMI, PERRLA, conjunctivae clear  Neck: supple; thyroid not palpable  Back: no tenderness  Respiratory: respiratory effort normal; clear to auscultation  Cardiovascular: S1S2 regular, no murmurs  Abdomen: soft, not tender, not distended, positive BS  Genitourinary: no suprapubic tenderness  Lymphatic: no LN palpable  Musculoskeletal: no muscle tenderness, no joint swelling or tenderness; right ankle tenderness  Extremities: no pedal edema  Right medial ankle open wound with VAC in place  Neurological/ Psychiatric: AxOx3, moving all extremities  Skin: no rashes; no palpable lesions    Labs: reviewed                        10.6   5.87  )-----------( 248      ( 24 May 2020 05:28 )             33.2     05-24    138  |  107  |  17  ----------------------------<  104<H>  3.9   |  24  |  1.12    Ca    9.3      24 May 2020 05:28    Vancomycin Level, Trough: 13.2 ug/mL (05-24 @ 05:28)                          10.3   6.89  )-----------( 259      ( 22 May 2020 06:47 )             33.3     05-22    141  |  110<H>  |  19  ----------------------------<  121<H>  4.3   |  24  |  1.37<H>    Ca    9.2      22 May 2020 06:47    TPro  8.3  /  Alb  3.7  /  TBili  0.9  /  DBili  x   /  AST  28  /  ALT  53  /  AlkPhos  325<H>  05-20     LIVER FUNCTIONS - ( 20 May 2020 11:54 )  Alb: 3.7 g/dL / Pro: 8.3 gm/dL / ALK PHOS: 325 U/L / ALT: 53 U/L / AST: 28 U/L / GGT: x             COVID-19 PCR: NotDetec (05-20-20 @ 11:54)    Radiology: all available radiological tests reviewed    < from: Xray Tibia + Fibula 2 Views, Right (05.20.20 @ 13:45) >  Status post reduction of dislocated talus and comminuted fractures of the lateral malleolus. Ossific fragments of the distal aspect of the medial malleolus.  External fixation device stabilizes the tibia with intramedullary and pins in the vertical external jenna apparatus in place. Postoperative a pin fixation of the proximal first metatarsal and calcaneus. Fracture segments are in proper anatomical alignment.    < end of copied text >      Advanced directives addressed: full resuscitation
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
## 2020-05-25 NOTE — PROGRESS NOTE ADULT - SUBJECTIVE AND OBJECTIVE BOX
Pt S/E at bedside, no acute events overnight, pain controlled    Vital Signs Last 24 Hrs  T(C): 37.4 (24 May 2020 22:38), Max: 37.4 (24 May 2020 22:38)  T(F): 99.3 (24 May 2020 22:38), Max: 99.3 (24 May 2020 22:38)  HR: 89 (24 May 2020 22:38) (89 - 91)  BP: 127/75 (24 May 2020 22:38) (109/58 - 127/75)  BP(mean): --  RR: 16 (24 May 2020 22:38) (16 - 16)  SpO2: 100% (24 May 2020 22:38) (100% - 100%)    Gen: NAD,    Right Lower Extremity:  Dressing/Splint clean dry intact  CAM Boot at bedside  Wiggling toes  SILT over toes  Brisk CR to toes  Accessible Compartments soft  No calf TTP LLE
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
## 2020-05-25 NOTE — PROGRESS NOTE ADULT - ASSESSMENT
A/P: 49M s/p R ankle removal of external fixator, ankle arthroscopy and wound vac application POD 4  seen by plastic surgery 5/24, wound vac removed  Pain Control  DVT ppx, lovenox per AC  NWB RLE in CAM boot   PT/OT  Incentive Spirometry  Medical management appreciated  ID recs appreciated, pt will likely need IV abx, PICC  On cefepime and vancomycin per ID  Right malunion wrist fracture, plan for OP follow up with Dr. Haas  pt refusing skin graft at this time, follow up with Dr. Broderick in office  FU with Dr. Hurd in 2 weeks  DC planning

## 2020-05-26 ENCOUNTER — TRANSCRIPTION ENCOUNTER (OUTPATIENT)
Age: 50
End: 2020-05-26

## 2020-05-26 VITALS
DIASTOLIC BLOOD PRESSURE: 63 MMHG | OXYGEN SATURATION: 100 % | SYSTOLIC BLOOD PRESSURE: 106 MMHG | HEART RATE: 94 BPM | RESPIRATION RATE: 18 BRPM | TEMPERATURE: 99 F

## 2020-05-26 PROCEDURE — 99231 SBSQ HOSP IP/OBS SF/LOW 25: CPT

## 2020-05-26 PROCEDURE — 71045 X-RAY EXAM CHEST 1 VIEW: CPT | Mod: 26

## 2020-05-26 PROCEDURE — 99239 HOSP IP/OBS DSCHRG MGMT >30: CPT

## 2020-05-26 RX ORDER — ENOXAPARIN SODIUM 100 MG/ML
40 INJECTION SUBCUTANEOUS
Qty: 30 | Refills: 1
Start: 2020-05-26 | End: 2020-07-24

## 2020-05-26 RX ORDER — POLYETHYLENE GLYCOL 3350 17 G/17G
17 POWDER, FOR SOLUTION ORAL
Qty: 0 | Refills: 0 | DISCHARGE

## 2020-05-26 RX ORDER — VANCOMYCIN HCL 1 G
1 VIAL (EA) INTRAVENOUS
Qty: 0 | Refills: 0 | DISCHARGE
Start: 2020-05-26

## 2020-05-26 RX ORDER — CEFEPIME 1 G/1
2 INJECTION, POWDER, FOR SOLUTION INTRAMUSCULAR; INTRAVENOUS
Qty: 0 | Refills: 0 | DISCHARGE
Start: 2020-05-26

## 2020-05-26 RX ADMIN — Medication 250 MILLIGRAM(S): at 17:09

## 2020-05-26 RX ADMIN — CEFEPIME 100 MILLIGRAM(S): 1 INJECTION, POWDER, FOR SOLUTION INTRAMUSCULAR; INTRAVENOUS at 05:47

## 2020-05-26 RX ADMIN — Medication 500 MILLIGRAM(S): at 17:10

## 2020-05-26 RX ADMIN — OXYCODONE HYDROCHLORIDE 5 MILLIGRAM(S): 5 TABLET ORAL at 12:00

## 2020-05-26 RX ADMIN — Medication 325 MILLIGRAM(S): at 08:19

## 2020-05-26 RX ADMIN — OXYCODONE HYDROCHLORIDE 10 MILLIGRAM(S): 5 TABLET ORAL at 06:45

## 2020-05-26 RX ADMIN — Medication 1 MILLIGRAM(S): at 12:00

## 2020-05-26 RX ADMIN — Medication 325 MILLIGRAM(S): at 17:09

## 2020-05-26 RX ADMIN — Medication 500 MILLIGRAM(S): at 05:47

## 2020-05-26 RX ADMIN — OXYCODONE HYDROCHLORIDE 10 MILLIGRAM(S): 5 TABLET ORAL at 20:57

## 2020-05-26 RX ADMIN — Medication 250 MILLIGRAM(S): at 06:42

## 2020-05-26 RX ADMIN — OXYCODONE HYDROCHLORIDE 5 MILLIGRAM(S): 5 TABLET ORAL at 17:11

## 2020-05-26 RX ADMIN — Medication 1 TABLET(S): at 12:00

## 2020-05-26 RX ADMIN — CEFEPIME 100 MILLIGRAM(S): 1 INJECTION, POWDER, FOR SOLUTION INTRAMUSCULAR; INTRAVENOUS at 17:02

## 2020-05-26 RX ADMIN — Medication 325 MILLIGRAM(S): at 12:00

## 2020-05-26 RX ADMIN — ENOXAPARIN SODIUM 40 MILLIGRAM(S): 100 INJECTION SUBCUTANEOUS at 05:47

## 2020-05-26 NOTE — DISCHARGE NOTE NURSING/CASE MANAGEMENT/SOCIAL WORK - NSSCCARECORD_GEN_ALL_CORE
Dr. Kirstie Ramírez - Pls see pt mychart that was sent to [facility] providers.
From: Froy Lainez  To: Daily Kidd MD  Sent: 6/8/2017 10:57 PM CDT  Subject: Visit Follow-up Question    Same msg to Cristopher Mooney:    I saw Dr Jazmine Hernandez on Sat, 6/3 & it was determined at that time that I had a cyst o
I have discussed with the patient laboratory results. She had negative stool for C. difficile toxin. Pathology of her colon shows acute and chronic proctitis with some colitis, angulation tissue. No CMV. Message reviewed below.     I have advised that s
[facility]/Home Care Agency

## 2020-05-26 NOTE — PROGRESS NOTE ADULT - SUBJECTIVE AND OBJECTIVE BOX
Pt S/E at bedside, no acute events overnight, pain controlled    Vital Signs Last 24 Hrs  T(C): 37.1 (26 May 2020 05:52), Max: 37.6 (25 May 2020 23:18)  T(F): 98.8 (26 May 2020 05:52), Max: 99.7 (25 May 2020 23:18)  HR: 93 (25 May 2020 23:18) (88 - 97)  BP: 106/65 (25 May 2020 23:18) (106/65 - 118/62)  BP(mean): 71 (25 May 2020 11:01) (71 - 71)  RR: 18 (25 May 2020 23:18) (17 - 18)  SpO2: 97% (25 May 2020 23:18) (97% - 100%)    Gen: NAD,    Right Lower Extremity:  Dressing/Splint clean dry intact  CAM Boot at bedside  Wiggling toes  SILT over toes  Brisk CR to toes  Accessible Compartments soft  No calf TTP LLE

## 2020-05-26 NOTE — PROGRESS NOTE ADULT - ASSESSMENT
A/P: 49M s/p R ankle removal of external fixator, ankle arthroscopy and wound vac application POD 5  seen by plastic surgery 5/24, wound vac removed  Pain Control  DVT ppx, lovenox per AC  NWB RLE in CAM boot   PT/OT  Incentive Spirometry  Medical management appreciated  ID recs appreciated, pt will likely need IV abx, PICC  On cefepime and vancomycin per ID  Right malunion wrist fracture, plan for OP follow up with Dr. Haas  pt refusing skin graft at this time, follow up with Dr. Broderick in office  FU with Dr. Hurd in 2 weeks  DC planning
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
## 2020-05-26 NOTE — PROGRESS NOTE ADULT - REASON FOR ADMISSION
RT ankle ex-fix removal

## 2020-05-26 NOTE — DISCHARGE NOTE NURSING/CASE MANAGEMENT/SOCIAL WORK - PATIENT PORTAL LINK FT
You can access the FollowMyHealth Patient Portal offered by [facility] by registering at the following website: [email or web address]. By joining [facility] FollowMyHealth portal, you will also be able to view your health information using other applications (apps) compatible with our system.

## 2020-05-26 NOTE — PROGRESS NOTE ADULT - ATTENDING COMMENTS
I spoke to the patient at length via the telephone.  All instructions provided.  Wound vac MWF changed via home nursing.   Plastic surgery consult appreciated  PICC - ID appreciated  DVT ppx x 4-6 weeks  NWB x 2 weeks  Ice/elevate  ROM

## 2020-05-26 NOTE — PROGRESS NOTE ADULT - SUBJECTIVE AND OBJECTIVE BOX
Date of service: 05-26-20 @ 09:28    Lying in bed in NAD  Ankle pain is improving    ROS: no fever or chills; denies dizziness, no HA, no SOB or cough, no abdominal pain, no diarrhea or constipation; no dysuria    MEDICATIONS  (STANDING):  ascorbic acid 500 milliGRAM(s) Oral two times a day  cefepime   IVPB 2000 milliGRAM(s) IV Intermittent every 12 hours  enoxaparin Injectable 40 milliGRAM(s) SubCutaneous every 24 hours  ferrous    sulfate 325 milliGRAM(s) Oral three times a day with meals  folic acid 1 milliGRAM(s) Oral daily  multivitamin 1 Tablet(s) Oral daily  sodium chloride 0.9%. 1000 milliLiter(s) (100 mL/Hr) IV Continuous <Continuous>  vancomycin  IVPB 1000 milliGRAM(s) IV Intermittent every 12 hours      Vital Signs Last 24 Hrs  T(C): 37.1 (26 May 2020 05:52), Max: 37.6 (25 May 2020 23:18)  T(F): 98.8 (26 May 2020 05:52), Max: 99.7 (25 May 2020 23:18)  HR: 93 (25 May 2020 23:18) (88 - 97)  BP: 106/65 (25 May 2020 23:18) (106/65 - 118/62)  BP(mean): 71 (25 May 2020 11:01) (71 - 71)  RR: 18 (25 May 2020 23:18) (17 - 18)  SpO2: 97% (25 May 2020 23:18) (97% - 100%)        Physical exam:    Constitutional:  No acute distress  HEENT: NC/AT, EOMI, PERRLA, conjunctivae clear  Neck: supple; thyroid not palpable  Back: no tenderness  Respiratory: respiratory effort normal; clear to auscultation  Cardiovascular: S1S2 regular, no murmurs  Abdomen: soft, not tender, not distended, positive BS  Genitourinary: no suprapubic tenderness  Lymphatic: no LN palpable  Musculoskeletal: no muscle tenderness, no joint swelling or tenderness; right ankle tenderness  Extremities: no pedal edema  Right medial ankle open wound with VAC in place  Neurological/ Psychiatric: AxOx3, moving all extremities  Skin: no rashes; no palpable lesions    Labs: reviewed                        10.6   5.87  )-----------( 248      ( 24 May 2020 05:28 )             33.2     05-24    138  |  107  |  17  ----------------------------<  104<H>  3.9   |  24  |  1.12    Ca    9.3      24 May 2020 05:28    Vancomycin Level, Trough: 13.2 ug/mL (05-24 @ 05:28)                          10.3   6.89  )-----------( 259      ( 22 May 2020 06:47 )             33.3     05-22    141  |  110<H>  |  19  ----------------------------<  121<H>  4.3   |  24  |  1.37<H>    Ca    9.2      22 May 2020 06:47    TPro  8.3  /  Alb  3.7  /  TBili  0.9  /  DBili  x   /  AST  28  /  ALT  53  /  AlkPhos  325<H>  05-20     LIVER FUNCTIONS - ( 20 May 2020 11:54 )  Alb: 3.7 g/dL / Pro: 8.3 gm/dL / ALK PHOS: 325 U/L / ALT: 53 U/L / AST: 28 U/L / GGT: x             COVID-19 PCR: NotDetec (05-20-20 @ 11:54)    Radiology: all available radiological tests reviewed    < from: Xray Tibia + Fibula 2 Views, Right (05.20.20 @ 13:45) >  Status post reduction of dislocated talus and comminuted fractures of the lateral malleolus. Ossific fragments of the distal aspect of the medial malleolus.  External fixation device stabilizes the tibia with intramedullary and pins in the vertical external jenna apparatus in place. Postoperative a pin fixation of the proximal first metatarsal and calcaneus. Fracture segments are in proper anatomical alignment.    < end of copied text >      Advanced directives addressed: full resuscitation

## 2020-05-26 NOTE — ADVANCED PRACTICE NURSE CONSULT - ASSESSMENT
Alert and oriented, chart reviewed prior to insertion, consent on chart, risk and benefits explained and pt agreed to the procedure, pt in bed in semi-fowlers position, A Navilyst Xcela PICC with PASV placed in the LUE in the basilic vein with good blood return and flushes well with 30mL/NS, vein access using the sonosite US, a 4Fr SL cath with length 49cm placed, minimal blood loss, procedure done using the MST with sterile and full barrier precautions, pt tolerated procedure well, all sharps accounted for, LOT # [id number], report given to district RN
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
## 2020-05-28 DIAGNOSIS — E43 UNSPECIFIED SEVERE PROTEIN-CALORIE MALNUTRITION: ICD-10-CM

## 2020-05-28 DIAGNOSIS — S82.001D UNSPECIFIED FRACTURE OF RIGHT PATELLA, SUBSEQUENT ENCOUNTER FOR CLOSED FRACTURE WITH ROUTINE HEALING: ICD-10-CM

## 2020-05-28 DIAGNOSIS — M86.8X7 OTHER OSTEOMYELITIS, ANKLE AND FOOT: ICD-10-CM

## 2020-05-28 DIAGNOSIS — S82.891D OTHER FRACTURE OF RIGHT LOWER LEG, SUBSEQUENT ENCOUNTER FOR CLOSED FRACTURE WITH ROUTINE HEALING: ICD-10-CM

## 2020-05-28 DIAGNOSIS — Z98.890 OTHER SPECIFIED POSTPROCEDURAL STATES: ICD-10-CM

## 2020-05-28 DIAGNOSIS — S62.101P FRACTURE OF UNSPECIFIED CARPAL BONE, RIGHT WRIST, SUBSEQUENT ENCOUNTER FOR FRACTURE WITH MALUNION: ICD-10-CM

## 2020-05-28 DIAGNOSIS — S93.401D SPRAIN OF UNSPECIFIED LIGAMENT OF RIGHT ANKLE, SUBSEQUENT ENCOUNTER: ICD-10-CM
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
## 2020-06-05 ENCOUNTER — APPOINTMENT (OUTPATIENT)
Dept: ORTHOPEDIC SURGERY | Facility: CLINIC | Age: 50
End: 2020-06-05
Payer: MEDICARE

## 2020-06-05 PROCEDURE — 73610 X-RAY EXAM OF ANKLE: CPT | Mod: RT

## 2020-06-05 PROCEDURE — 99024 POSTOP FOLLOW-UP VISIT: CPT

## 2020-06-05 PROCEDURE — 97760 ORTHOTIC MGMT&TRAING 1ST ENC: CPT | Mod: 58

## 2020-06-05 NOTE — HISTORY OF PRESENT ILLNESS
[de-identified] : Removal of external fixator.\par Right ankle arthroscopy.\par DOS 5/21/2020 [de-identified] : Cj is a 50-year-old male who presents in a wheelchair today for evaluation of his right ankle.  He is status post removal of external fixator and a right ankle arthroscopy on 5/21/2020. He presents to have his sutures removed from his arthroscopic portals of his right ankle. He has a wound Vac applied to the medial side of the ankle for a medial sided ankle wound.  He has min pain today in office.  [de-identified] : Right Ankle Physical Examination:\par \par General: Alert and oriented x3.  In no acute distress.  Pleasant in nature with a normal affect.  No apparent respiratory distress. \par Erythema, Warmth, Rubor: Negative\par Swelling: Positive swelling.\par \par *Wound VAC intact medial side of ankle. Ankle arthroscopic portals are clean, dry, intact and healed. Nylon sutures were removed. There was no wound dehiscence, drainage, signs of infection coming from the wounds.\par \par ROM:\par 1. Dorsiflexion: 0 degrees\par 2. Plantarflexion: 40 degrees\par 3. Inversion: 10 degrees\par 4. Eversion: 10 degrees\par \par Tenderness to Palpation: \par 1. Lateral Malleolus: Negative\par 2. Medial Malleolus: Negative\par 3. Proximal Fibular Pain: Negative\par 4. Heel Pain: Negative\par 5. Cuboid: Negative\par 6. Navicular: Negative\par 7. Tibiotalar Joint: Negative\par 8. Subtalar Joint: Negative\par 9. Posterior Recess: Negative\par \par Tendon Pain:\par 1. Achilles: Negative\par 2. Peroneals: Negative\par 3. Posterior Tibialis: Negative\par 4. Tibialis Anterior: Negative\par \par Ligament Pain:\par 1. ATFL: Negative\par 2. CFL: Negative \par 3. PTFL: Negative\par 4. Deltoid Ligaments: Negative\par 5. Lis Franc Ligament: Negative\par \par Stability: \par 1. Anterior Drawer: Negative\par 2. Posterior Drawer: Negative\par \par Strength: 5/5 TA/GS/EHL\par \par Pulses: 2+ DP/PT Pulses\par \par Neuro: Intact motor and sensory\par \par Additional Test:\par 1. Calcaneal Squeeze Test: Negative\par 2. Syndesmosis Squeeze Test: Negative\par  [de-identified] : X-rays of the right ankle taken in office today and reviewed with the patient showed: Stable ankle x-rays. Old fractures are healing. [de-identified] : Removal of external fixator.\par Right ankle arthroscopy.\par DOS 5/21/2020 [de-identified] : #1. Short boot given. Use as directed. Weightbearing as tolerated starting in approximately one week if he can tolerate it.\par #2. Continue DVT prophylaxis with Lovenox injections.\par #3. Continue monitoring the medial side ankle wound. He has a home nurse taking care of the wound and wound VAC.\par #4. Continue ice and elevation of the right leg for swelling control.\par #5. Followup in 4 weeks. All of his questions were answered. The patient will continue with home physical therapy.

## 2020-06-11 NOTE — H&P ADULT - NSICDXFAMILYHX_GEN_ALL_CORE_FT
[facility] formerly [facility]...
FAMILY HISTORY:  Family history of throat cancer, father- was smoker

## 2020-07-06 ENCOUNTER — APPOINTMENT (OUTPATIENT)
Dept: ORTHOPEDIC SURGERY | Facility: CLINIC | Age: 50
End: 2020-07-06
Payer: MEDICARE

## 2020-07-06 DIAGNOSIS — M19.171 POST-TRAUMATIC OSTEOARTHRITIS, RIGHT ANKLE AND FOOT: ICD-10-CM

## 2020-07-06 PROCEDURE — 73610 X-RAY EXAM OF ANKLE: CPT | Mod: RT

## 2020-07-06 PROCEDURE — 99024 POSTOP FOLLOW-UP VISIT: CPT

## 2020-07-06 NOTE — HISTORY OF PRESENT ILLNESS
[___ Weeks Post Op] : [unfilled] weeks post op [Clean/Dry/Intact] : clean, dry and intact [Healed] : healed [Neuro Intact] : an unremarkable neurological exam [Vascular Intact] : ~T peripheral vascular exam normal [Excellent Pain Control] : has excellent pain control [Doing Well] : is doing well [Negative Justen's] : maneuvers demonstrated a negative Justen's sign [No Sign of Infection] : is showing no signs of infection [Chills] : no chills [Fever] : no fever [Nausea] : no nausea [Vomiting] : no vomiting [Erythema] : not erythematous [Discharge] : absent of discharge [Swelling] : not swollen [Dehiscence] : not dehisced [de-identified] : s/p Removal of external fixator, Right ankle arthroscopy.\par DOS 5/21/2020. [de-identified] : 50 year old male present in the office 6 weeks post op from Removal of external fixator, Right ankle arthroscopy. The patient's pain is noted to be a 0/10. The pain is noted to be 75% improved, and the swelling is noted to be 25% improved compared to the previous visit. The patient presents ambulating in crutches. The patient presents wearing a stiff shoe and Marlene brace. He is currently taking Tylenol. No other complaints at this time.  [de-identified] : General: Alert and oriented x3. In no acute distress. Pleasant in nature with a normal affect. No apparent respiratory distress.\par The wound is intact. No evidence of any diastases or dehiscence. No surrounding erythema noted. No fluctuance. The area is warm and well perfused. The patient is able to wiggle their toes. Neurovascularly intact.  [de-identified] : 3V of the right ankle were ordered obtained and reviewed by me today, 07/06/2020 , revealed: Stable ankle x-rays. [de-identified] : Patient is a 50 year old male present in the office today 6 weeks s/p Removal of external fixator, Right ankle arthroscopy. I recommend the patient undergo a course of physical therapy for the right ankle 2-3 times a week for a total of 6-8 weeks. A prescription was given for the physical therapy today. I advised the patient to transition out of the stiff shoe and into a stiff sneaker. The patient was provided with a new ace wrap today in the office. I advised the patient to continue with the PICC line. I recommend that the patient utilize ice. They can also elevate their right ankle above the level of the heart. I recommend that the patient utilize a runner knee brace. The patient was provided with the runner brace in the office today. I would like to see the patient back in the office in 2 months to reassess their condition. I have addressed all the patient's concerns surrounding the pathology of their condition. The patient understood and verbally agreed to the treatment plan. All of their questions were answered and they were satisfied with the visit. The patient should call the office if they have any questions or experience worsening symptoms.

## 2020-07-06 NOTE — ADDENDUM
[FreeTextEntry1] : I, Richard Mcgowan, acted solely as a scribe for Dr. Luico Hurd on this date 07/06/2020 .\par All medical record entries made by the Scribe were at my, Dr. Lucio Hurd, direction and personally dictated by me on 07/06/2020 . I have reviewed the chart and agree that the record accurately reflects my personal performance of the history, physical exam, assessment and plan. I have also personally directed, reviewed, and agreed with the chart.

## 2020-09-09 ENCOUNTER — APPOINTMENT (OUTPATIENT)
Dept: ORTHOPEDIC SURGERY | Facility: CLINIC | Age: 50
End: 2020-09-09
Payer: MEDICARE

## 2020-09-09 DIAGNOSIS — S82.891A OTHER FRACTURE OF RIGHT LOWER LEG, INITIAL ENCOUNTER FOR CLOSED FRACTURE: ICD-10-CM

## 2020-09-09 DIAGNOSIS — S86.811D STRAIN OF OTHER MUSCLE(S) AND TENDON(S) AT LOWER LEG LEVEL, RIGHT LEG, SUBSEQUENT ENCOUNTER: ICD-10-CM

## 2020-09-09 DIAGNOSIS — S82.831A OTHER FRACTURE OF UPPER AND LOWER END OF RIGHT FIBULA, INITIAL ENCOUNTER FOR CLOSED FRACTURE: ICD-10-CM

## 2020-09-09 DIAGNOSIS — S93.314A DISLOCATION OF TARSAL JOINT OF RIGHT FOOT, INITIAL ENCOUNTER: ICD-10-CM

## 2020-09-09 DIAGNOSIS — M25.561 PAIN IN RIGHT KNEE: ICD-10-CM

## 2020-09-09 PROCEDURE — 99213 OFFICE O/P EST LOW 20 MIN: CPT

## 2020-09-09 NOTE — HISTORY OF PRESENT ILLNESS
[FreeTextEntry1] : 50 year old male presenting with right ankle pain. The patient’s pain is noted to be a 5/10. The pain and swelling are noted to be improved compared to the previous visit, but still notes some swelling. He reports having some pain after a strenuous day. He notes he is more ambulatory. The patient presents ambulating with a cane. He presents with a right knee brace. He is currently taking no pain medication. No other complaints at this time.

## 2020-09-09 NOTE — DISCUSSION/SUMMARY
[de-identified] : Today I had a lengthy discussion with the patient regarding their right ankle pain. I have addressed all the patient's concerns surrounding the pathology of their condition. I advised the patient to utilize ice. I recommend the patient undergo a course of physical therapy for the right ankle and right knee 2-3 times a week for a total of 6-8 weeks. A prescription was given for the physical therapy today. I would like to see the patient back in the office in 3-6 months to reassess their condition. The patient understood and verbally agreed to the treatment plan. All of their questions were answered and they were satisfied with the visit. The patient should call the office if they have any questions or experience worsening symptoms.

## 2020-09-09 NOTE — PHYSICAL EXAM
[de-identified] : General: Alert and oriented x3. In no acute distress. Pleasant in nature with a normal affect. No apparent respiratory distress.\par \par R Ankle Exam\par Skin: Clean, dry, intact\par Inspection: Medial wound healed. +keloid. No obvious malalignment, +swelling, no effusion; no lymphadenopathy\par Pulses: 2+ DP/PT pulses\par ROM: R Ankle 10 degrees of dorsiflexion, 40 degrees of plantarflexion, 10 degrees of subtalar motion\par Tenderness: No tenderness over the lateral malleolus, no CFL/ATFL/PTFL pain. No medial malleolus pain, no deltoid ligament pain. No proximal fibular pain. No heel pain.\par Stability: Negative anterior/posterior drawer.\par Strength: 5/5 TA/GS/EHL\par Neuro: In tact to light touch throughout\par Additional tests: Negative Mortons test, Negative syndesmosis squeeze test.  [de-identified] : None new obtained.

## 2020-12-07 ENCOUNTER — APPOINTMENT (OUTPATIENT)
Dept: ORTHOPEDIC SURGERY | Facility: CLINIC | Age: 50
End: 2020-12-07

## 2021-04-20 NOTE — ED PROVIDER NOTE - NS ED ATTENDING STATEMENT MOD
Returned call to the pharmacy to provide Dx code for the Gabapentin.    I have personally seen and examined this patient.  I have fully participated in the care of this patient. I have reviewed all pertinent clinical information, including history, physical exam, plan and the Resident’s note and agree except as noted.

## 2021-08-21 NOTE — PROVIDER CONTACT NOTE (OTHER) - SITUATION
Called Dr. Myers's answering service for morning consult.
PAST SURGICAL HISTORY:  No significant past surgical history

## 2021-12-27 NOTE — DISCHARGE NOTE NURSING/CASE MANAGEMENT/SOCIAL WORK - NSSCTYPOFSERV_GEN_ALL_CORE
Received a PA for Lyrica 100 mg. PA would not go through. Called pharmacy and they stated that Lyrica does come in a 200 mg dose. Can we please get a order for 200 instead of 100 for insurance to approve.  Please advise
RN,PHYSICAL THERAPY

## 2022-06-20 NOTE — PHYSICAL THERAPY INITIAL EVALUATION ADULT - ASSISTIVE DEVICE FOR TRANSFER: SIT/STAND, REHAB EVAL
Routing refill request to provider for review/approval because:  Drug not on the [facility] refill protocol   Dee Hyatt RN    axillary crutches/L UE crutch only

## 2022-10-03 NOTE — H&P ADULT - CLICK TO LAUNCH ORM
Spoke with patient's wife, Kristy, regarding his Surgical procedure on 10/4/22. Informed that Dr. Ku will not be available to complete surgery for medical reasons but will reach out to other team surgeons. Will update pt. Kristy verbalized understanding.    .

## 2022-12-19 NOTE — PHYSICAL THERAPY INITIAL EVALUATION ADULT - REFERRING PHYSICIAN, REHAB EVAL
[FreeTextEntry1] : I, Alfred Hernandez, assisted in documentation on 12/19/2022 acting as a scribe for Dr. Latrell Lima.\par \par  Janice Keith

## 2023-05-24 NOTE — ED PROVIDER NOTE - PENIS
-- DO NOT REPLY / DO NOT REPLY ALL --  -- Message is from Engagement Center Operations ([facility]) --    Referral Request  Name of Specialist: Umair Spann at [facility]   Provider's specialty: Cardiology    Medical condition for referral:  PCI; dx Z53.09,  I25.10    Is this a NEW request?: yes      Referral ordered by: Dr Jil Goldstein       Insurance type: See below       Payor: [facility] MEDICARE ADVANTAGE / Plan:  BE /053 / Product Type:  MEDICARE ADVANTAGE      Preferred Delivery Method   Fax - number to send to: [phone]    Caller Information       Type Contact Phone/Fax    05/24/2023 08:58 AM CDT Phone (Incoming) Caroline at [facility] [phone]     Fax [phone]          Alternative phone number: NA    Can a detailed message be left? Yes secure voicemail    Please give this turnaround time to the caller:   \"This message will be sent to [state Provider's full name]. The clinical team will return your call as soon as they review your message. Typically, it takes 3 business days to process referral requests.\"   normal

## 2024-11-19 NOTE — PROGRESS NOTE ADULT - SUBJECTIVE AND OBJECTIVE BOX
Patient has been cleared for sports participation without    JANES ALDRICH    [id number]    History:  The patient is status post right wrist closed reduction, right knee I&D/ patella tendon repair, right ankle open reduction talus with Ex-Fix application, POD # 8. Patient is doing well. Awaiting ZORA placement. The patient's pain is controlled using the prescribed pain medications. The patient is participating in physical therapy. Denies nausea, vomiting, chest pain, shortness of breath, abdominal pain or fever. No new complaints.     Vital Signs Last 24 Hrs  T(C): 37.1 (14 Apr 2020 16:00), Max: 37.1 (14 Apr 2020 16:00)  T(F): 98.8 (14 Apr 2020 16:00), Max: 98.8 (14 Apr 2020 16:00)  HR: 98 (14 Apr 2020 16:00) (98 - 98)  BP: 136/72 (14 Apr 2020 16:00) (136/72 - 136/72)  BP(mean): --  RR: 18 (14 Apr 2020 16:00) (18 - 18)  SpO2: 96% (14 Apr 2020 16:00) (96% - 96%)    Physical exam: Lying in bed in NAD, awake and alert  Right upper extremity- Well padded splint is in good position. + ROM fingers. Sensation intact distally. Brisk cap refill.  Right lower extremity- The Whitley brace and prevena vac dressings are intact and functioning, no output in knee vac, minimal output in ankle vac. No wound erythema, discharge, drainage is noted. Thigh soft. Knee swelling improving. Lower leg compartments soft and compressible. No calf tenderness. Sensation to light touch is grossly intact distally. Motor function distally is 5/5. No foot drop. Weak EHL/ FHL.  2+ dorsalis pedis pulse. Capillary refill is less than 2 seconds. No cyanosis.    Primary Orthopedic Assessment:  • S/P CR right distal radius, I&D/ patella tendon repair, ankle reduction Ex-Fix RLE, POD#8    Plan:   • DVT prophylaxis as prescribed- Lovenox, including use of compression devices and ankle pumps  • Continue physical therapy  • Non-weight bearing of the RUE/ RLE, No ROM right knee  • Continue splint and adela brace as applied  • Elevation RUE/ RLE encouraged  • Pain control as clinically indicated  • continue prevena vac dressings  • Discharge planning – anticipated discharge is subacute rehabilitation   • continue care per primary team

## 2025-02-13 NOTE — DISCHARGE NOTE PROVIDER - NSDCQMPCI_CARD_ALL_CORE
Called patient and discussed test results and recommendations from PCP.  Patient had no further questions and will start his lisinopril this afternoon.  Patient will return to clinic in 1 month for lab visit in addition he is going to work on setting up [employer] [facility] so that way he can send us his home blood pressure readings in the coming weeks.      ----- Message from Sb Lugo sent at 2/13/2025 12:23 PM CST -----  Please call patient's.    Sodium levels remain slightly low as well as chloride levels.  Based off of this, we should stop his hydrochlorothiazide for now.  I have sent an alternative medication of just lisinopril only.  If his blood pressure fails to be controlled with lisinopril alone, we can consider other alternative add-on medications.    I would also like him to recheck his metabolic panel in approximately 1 month after stopping the hydrochlorothiazide to verify his electrolytes levels have normalized.  I have future ordered this.    -li lugo, pac   No

## 2025-04-01 NOTE — H&P PST ADULT - ADMIT DATE
09-Aug-2017 Pt has already completed US.  Called and LVM for pt with results per Verbal Release for this office.

## 2025-04-11 NOTE — PHYSICAL THERAPY INITIAL EVALUATION ADULT - FOLLOWS COMMANDS/ANSWERS QUESTIONS, REHAB EVAL
Gastrointestinal Esophagogastroduodenoscopy (EGD) and Colonoscopy- Discharge Instructions    1. Call Dr. Painting at [phone] for any problems or questions.    2. Contact the doctor's office for follow up appointment as directed.    3. Medication may cause drowsiness for several hours, therefore, do not drive or operate machinery for remainder of the day.    4. No alcohol today.    5. Do not make any important decisions such as signing legal paperwork.    6. Ordinarily, you may resume regular diet and activity after exam unless otherwise specified by your physician.    7. For mild soreness in your throat you may use Cepacol throat lozenges or warm  salt-water gargles as needed.    8. Because of air put into your colon during exam, you may experience some abdominal distension, relieved by the passage of gas, for several hours.    9. Contact your physician if you have any of the following:  Excessive amount of bleeding - large amount when having a bowel movement.  Occasional specks of blood with bowel movement would not be unusual.  Severe abdominal pain  Fever or Chills    10. Polyp Removal - follow these additional instructions  Clear liquid diet for the next meal (jell-o, broth, clear drinks)  Soft diet for 24 hours, then resume regular diet   Take Metamucil - 1 tablespoon in juice every morning for 3 days, if needed.  No Aspirin, Advil, Aleve, Nuprin, Ibuprofen, or medications that contain these drugs for 2 weeks, unless taken for a heart condition.    Any additional instructions:     Recommendation:         -  Resume previous diet.         -  Continue present medications.         -  Patient has a contact number available for emergencies.  The            signs and symptoms of potential delayed complications were discussed            with the patient.  Return to normal activities tomorrow.  Written            discharge instructions were provided to the patient.         -  Repeat colonoscopy in 10  100% of the time